# Patient Record
Sex: FEMALE | Race: WHITE | NOT HISPANIC OR LATINO | URBAN - METROPOLITAN AREA
[De-identification: names, ages, dates, MRNs, and addresses within clinical notes are randomized per-mention and may not be internally consistent; named-entity substitution may affect disease eponyms.]

---

## 2019-05-17 ENCOUNTER — INPATIENT (INPATIENT)
Facility: HOSPITAL | Age: 59
LOS: 1 days | Discharge: HOME | End: 2019-05-19
Attending: INTERNAL MEDICINE | Admitting: INTERNAL MEDICINE
Payer: MEDICAID

## 2019-05-17 VITALS
HEART RATE: 105 BPM | OXYGEN SATURATION: 92 % | DIASTOLIC BLOOD PRESSURE: 51 MMHG | SYSTOLIC BLOOD PRESSURE: 98 MMHG | RESPIRATION RATE: 20 BRPM

## 2019-05-17 DIAGNOSIS — Z90.710 ACQUIRED ABSENCE OF BOTH CERVIX AND UTERUS: Chronic | ICD-10-CM

## 2019-05-17 DIAGNOSIS — Z98.84 BARIATRIC SURGERY STATUS: Chronic | ICD-10-CM

## 2019-05-17 DIAGNOSIS — Z90.89 ACQUIRED ABSENCE OF OTHER ORGANS: Chronic | ICD-10-CM

## 2019-05-17 LAB
ALBUMIN SERPL ELPH-MCNC: 3.9 G/DL — SIGNIFICANT CHANGE UP (ref 3.5–5.2)
ALP SERPL-CCNC: 139 U/L — HIGH (ref 30–115)
ALT FLD-CCNC: 33 U/L — SIGNIFICANT CHANGE UP (ref 0–41)
ANION GAP SERPL CALC-SCNC: 11 MMOL/L — SIGNIFICANT CHANGE UP (ref 7–14)
APAP SERPL-MCNC: <5 UG/ML — LOW (ref 10–30)
APTT BLD: 35.2 SEC — SIGNIFICANT CHANGE UP (ref 27–39.2)
AST SERPL-CCNC: 33 U/L — SIGNIFICANT CHANGE UP (ref 0–41)
BASE EXCESS BLDV CALC-SCNC: 1.1 MMOL/L — SIGNIFICANT CHANGE UP (ref -2–2)
BASOPHILS # BLD AUTO: 0.02 K/UL — SIGNIFICANT CHANGE UP (ref 0–0.2)
BASOPHILS NFR BLD AUTO: 0.5 % — SIGNIFICANT CHANGE UP (ref 0–1)
BILIRUB SERPL-MCNC: 0.3 MG/DL — SIGNIFICANT CHANGE UP (ref 0.2–1.2)
BLD GP AB SCN SERPL QL: SIGNIFICANT CHANGE UP
BUN SERPL-MCNC: 26 MG/DL — HIGH (ref 10–20)
CA-I SERPL-SCNC: 1.18 MMOL/L — SIGNIFICANT CHANGE UP (ref 1.12–1.3)
CALCIUM SERPL-MCNC: 8.8 MG/DL — SIGNIFICANT CHANGE UP (ref 8.5–10.1)
CHLORIDE SERPL-SCNC: 105 MMOL/L — SIGNIFICANT CHANGE UP (ref 98–110)
CK MB CFR SERPL CALC: 6.1 NG/ML — SIGNIFICANT CHANGE UP (ref 0.6–6.3)
CK SERPL-CCNC: 123 U/L — SIGNIFICANT CHANGE UP (ref 0–225)
CO2 SERPL-SCNC: 26 MMOL/L — SIGNIFICANT CHANGE UP (ref 17–32)
CREAT SERPL-MCNC: 1.2 MG/DL — SIGNIFICANT CHANGE UP (ref 0.7–1.5)
EOSINOPHIL # BLD AUTO: 0.1 K/UL — SIGNIFICANT CHANGE UP (ref 0–0.7)
EOSINOPHIL NFR BLD AUTO: 2.3 % — SIGNIFICANT CHANGE UP (ref 0–8)
ETHANOL SERPL-MCNC: <10 MG/DL — SIGNIFICANT CHANGE UP
GAS PNL BLDV: 141 MMOL/L — SIGNIFICANT CHANGE UP (ref 136–145)
GAS PNL BLDV: SIGNIFICANT CHANGE UP
GLUCOSE SERPL-MCNC: 107 MG/DL — HIGH (ref 70–99)
HCO3 BLDV-SCNC: 28 MMOL/L — SIGNIFICANT CHANGE UP (ref 22–29)
HCT VFR BLD CALC: 38 % — SIGNIFICANT CHANGE UP (ref 37–47)
HCT VFR BLDA CALC: 37.2 % — SIGNIFICANT CHANGE UP (ref 34–44)
HGB BLD CALC-MCNC: 12.1 G/DL — LOW (ref 14–18)
HGB BLD-MCNC: 11.6 G/DL — LOW (ref 12–16)
IMM GRANULOCYTES NFR BLD AUTO: 0 % — LOW (ref 0.1–0.3)
INR BLD: 0.96 RATIO — SIGNIFICANT CHANGE UP (ref 0.65–1.3)
LACTATE BLDV-MCNC: 1.1 MMOL/L — SIGNIFICANT CHANGE UP (ref 0.5–1.6)
LYMPHOCYTES # BLD AUTO: 1.12 K/UL — LOW (ref 1.2–3.4)
LYMPHOCYTES # BLD AUTO: 25.8 % — SIGNIFICANT CHANGE UP (ref 20.5–51.1)
MCHC RBC-ENTMCNC: 27.6 PG — SIGNIFICANT CHANGE UP (ref 27–31)
MCHC RBC-ENTMCNC: 30.5 G/DL — LOW (ref 32–37)
MCV RBC AUTO: 90.5 FL — SIGNIFICANT CHANGE UP (ref 81–99)
MONOCYTES # BLD AUTO: 0.51 K/UL — SIGNIFICANT CHANGE UP (ref 0.1–0.6)
MONOCYTES NFR BLD AUTO: 11.8 % — HIGH (ref 1.7–9.3)
NEUTROPHILS # BLD AUTO: 2.59 K/UL — SIGNIFICANT CHANGE UP (ref 1.4–6.5)
NEUTROPHILS NFR BLD AUTO: 59.6 % — SIGNIFICANT CHANGE UP (ref 42.2–75.2)
NRBC # BLD: 0 /100 WBCS — SIGNIFICANT CHANGE UP (ref 0–0)
PCO2 BLDV: 56 MMHG — HIGH (ref 41–51)
PH BLDV: 7.31 — SIGNIFICANT CHANGE UP (ref 7.26–7.43)
PLATELET # BLD AUTO: 161 K/UL — SIGNIFICANT CHANGE UP (ref 130–400)
PO2 BLDV: 41 MMHG — HIGH (ref 20–40)
POTASSIUM BLDV-SCNC: 4.6 MMOL/L — SIGNIFICANT CHANGE UP (ref 3.3–5.6)
POTASSIUM SERPL-MCNC: 4.7 MMOL/L — SIGNIFICANT CHANGE UP (ref 3.5–5)
POTASSIUM SERPL-SCNC: 4.7 MMOL/L — SIGNIFICANT CHANGE UP (ref 3.5–5)
PROT SERPL-MCNC: 6.5 G/DL — SIGNIFICANT CHANGE UP (ref 6–8)
PROTHROM AB SERPL-ACNC: 11.1 SEC — SIGNIFICANT CHANGE UP (ref 9.95–12.87)
RBC # BLD: 4.2 M/UL — SIGNIFICANT CHANGE UP (ref 4.2–5.4)
RBC # FLD: 13.4 % — SIGNIFICANT CHANGE UP (ref 11.5–14.5)
SALICYLATES SERPL-MCNC: <0.3 MG/DL — LOW (ref 4–30)
SAO2 % BLDV: 77 % — SIGNIFICANT CHANGE UP
SODIUM SERPL-SCNC: 142 MMOL/L — SIGNIFICANT CHANGE UP (ref 135–146)
TROPONIN T SERPL-MCNC: <0.01 NG/ML — SIGNIFICANT CHANGE UP
TYPE + AB SCN PNL BLD: SIGNIFICANT CHANGE UP
WBC # BLD: 4.34 K/UL — LOW (ref 4.8–10.8)
WBC # FLD AUTO: 4.34 K/UL — LOW (ref 4.8–10.8)

## 2019-05-17 PROCEDURE — 71045 X-RAY EXAM CHEST 1 VIEW: CPT | Mod: 26

## 2019-05-17 PROCEDURE — 99291 CRITICAL CARE FIRST HOUR: CPT

## 2019-05-17 PROCEDURE — 70450 CT HEAD/BRAIN W/O DYE: CPT | Mod: 26

## 2019-05-17 PROCEDURE — 93010 ELECTROCARDIOGRAM REPORT: CPT

## 2019-05-17 RX ORDER — SODIUM CHLORIDE 9 MG/ML
1000 INJECTION, SOLUTION INTRAVENOUS ONCE
Refills: 0 | Status: COMPLETED | OUTPATIENT
Start: 2019-05-17 | End: 2019-05-17

## 2019-05-17 RX ORDER — ASPIRIN/CALCIUM CARB/MAGNESIUM 324 MG
325 TABLET ORAL ONCE
Refills: 0 | Status: COMPLETED | OUTPATIENT
Start: 2019-05-17 | End: 2019-05-17

## 2019-05-17 RX ORDER — ASPIRIN/CALCIUM CARB/MAGNESIUM 324 MG
81 TABLET ORAL DAILY
Refills: 0 | Status: DISCONTINUED | OUTPATIENT
Start: 2019-05-18 | End: 2019-05-19

## 2019-05-17 RX ORDER — ATORVASTATIN CALCIUM 80 MG/1
40 TABLET, FILM COATED ORAL AT BEDTIME
Refills: 0 | Status: DISCONTINUED | OUTPATIENT
Start: 2019-05-18 | End: 2019-05-19

## 2019-05-17 RX ORDER — CLONAZEPAM 1 MG
0.5 TABLET ORAL
Refills: 0 | Status: DISCONTINUED | OUTPATIENT
Start: 2019-05-17 | End: 2019-05-19

## 2019-05-17 RX ORDER — TRAZODONE HCL 50 MG
1 TABLET ORAL
Qty: 0 | Refills: 0 | DISCHARGE

## 2019-05-17 RX ORDER — GABAPENTIN 400 MG/1
300 CAPSULE ORAL
Refills: 0 | Status: DISCONTINUED | OUTPATIENT
Start: 2019-05-17 | End: 2019-05-19

## 2019-05-17 RX ORDER — CYCLOBENZAPRINE HYDROCHLORIDE 10 MG/1
10 TABLET, FILM COATED ORAL THREE TIMES A DAY
Refills: 0 | Status: DISCONTINUED | OUTPATIENT
Start: 2019-05-17 | End: 2019-05-19

## 2019-05-17 RX ORDER — ATORVASTATIN CALCIUM 80 MG/1
40 TABLET, FILM COATED ORAL ONCE
Refills: 0 | Status: COMPLETED | OUTPATIENT
Start: 2019-05-17 | End: 2019-05-17

## 2019-05-17 RX ORDER — TRAZODONE HCL 50 MG
100 TABLET ORAL
Refills: 0 | Status: DISCONTINUED | OUTPATIENT
Start: 2019-05-17 | End: 2019-05-19

## 2019-05-17 RX ADMIN — Medication 100 MILLIGRAM(S): at 23:48

## 2019-05-17 RX ADMIN — GABAPENTIN 300 MILLIGRAM(S): 400 CAPSULE ORAL at 23:48

## 2019-05-17 RX ADMIN — Medication 325 MILLIGRAM(S): at 19:26

## 2019-05-17 RX ADMIN — ATORVASTATIN CALCIUM 40 MILLIGRAM(S): 80 TABLET, FILM COATED ORAL at 19:26

## 2019-05-17 RX ADMIN — SODIUM CHLORIDE 1000 MILLILITER(S): 9 INJECTION, SOLUTION INTRAVENOUS at 18:39

## 2019-05-17 RX ADMIN — Medication 0.5 MILLIGRAM(S): at 23:48

## 2019-05-17 NOTE — ED PROVIDER NOTE - OBJECTIVE STATEMENT
60 y/o F PMH Hep C who presents with slurred speech and not feeling right.  Patient was last known and felt well (with a friend at 1-1:30pm).  Patient then started having slurred speech.  She then smoke marijuana and continued not to feel well. Patient then spoke with her significant other and told him she thought she was having a stroke and he brought her into the hospital.  Patient was otherwise in normal state of health this morning and smokes marijuana daily.  She denies any alcohol or other drug issue.  No fever or recent illness.  No falls or head trauma.    Patient denies chest pain, SOB, numbness, weakness, tingling.

## 2019-05-17 NOTE — ED ADULT NURSE REASSESSMENT NOTE - NS ED NURSE REASSESS COMMENT FT1
pt assessed by oncoming rn . pt [id number]. pt nih scale 1. pt vss. pt satting 99% on room air. pt passed dysphagia screen. rn will continue to monitor.

## 2019-05-17 NOTE — ED PROVIDER NOTE - PHYSICAL EXAMINATION
VITAL SIGNS: I have reviewed nursing notes and confirm.  CONSTITUTIONAL: Patient is slow to answer questions with slurred speech.  No AOB but patient may be intoxicated on drugs (e.g. marijuana)  SKIN: Warm dry, normal skin turgor  HEAD: NCAT  EYES: EOMI, PERRLA, no scleral icterus  ENT: Moist mucous membranes, normal pharynx with no erythema or exudates  NECK: Supple; non tender. Full ROM. No cervical LAD  CARD: RRR, no murmurs, rubs or gallops  RESP: clear to ausculation b/l.  No rales, rhonchi, or wheezing.  ABD: soft, + BS, non-tender, non-distended, no rebound or guarding. No CVA tenderness  EXT: Full ROM, no bony tenderness, no pedal edema, no calf tenderness  NEURO: NIH as documented in stroke note. normal motor. normal sensory. CN II-XII intact. Cerebellar testing normal. Normal gait.  PSYCH: Cooperative, appropriate.

## 2019-05-17 NOTE — H&P ADULT - NSICDXPASTMEDICALHX_GEN_ALL_CORE_FT
PAST MEDICAL HISTORY:  Cervical cancer     Chronic hepatitis C without hepatic coma     History of osteonecrosis     Prediabetes

## 2019-05-17 NOTE — H&P ADULT - NSHPPHYSICALEXAM_GEN_ALL_CORE
Vital Signs Last 24 Hrs  T(C): 36.7 (17 May 2019 20:40), Max: 36.7 (17 May 2019 20:40)  T(F): 98 (17 May 2019 20:40), Max: 98 (17 May 2019 20:40)  HR: 94 (17 May 2019 20:40) (87 - 105)  BP: 133/75 (17 May 2019 20:40) (90/54 - 133/75)  RR: 18 (17 May 2019 20:40) (18 - 20)  SpO2: 99% (17 May 2019 20:40) (92% - 99%)    alert oriented, not in any acute distress  no facial droop  chest clear bilaterally  cvs s1 and s2 no added sound  abdomen soft lax no organomegaly  no pedal edema  neuro all cranial nerves intact  power 5/5 bilateral UL/ LL  sensations intact bilaterally  cerebellar sign negative  babinski neg

## 2019-05-17 NOTE — ED PROVIDER NOTE - CLINICAL SUMMARY MEDICAL DECISION MAKING FREE TEXT BOX
Patient was a stroke code on arrival for slurred speech and not feeling well.  Initial NIH as documented 2).  Patient is outside of the t-PA window and not a candidate for t-PA.  Stroke attending is Dr. Angel and case discussed in detail and plan of care discussed. . Patient is not an interventional candidate given current NIH score.  Clinical picture also clouded by possible drug intoxication. Head CT shows no acute findings.  Will give Aspirin and Statin and patient being admitted to Stroke Unit.

## 2019-05-17 NOTE — H&P ADULT - ASSESSMENT
59 years old female pt with past medical hx of hep c never treated, osteonecrosis of bilateral knees and prediabetes came to ER because of slurring of speech.    # slurring of speech for evaluation      doubt stroke      ct head unremarkable      likely medication induced      will check urine for toxicology      will try to taper her sedatives      reduce gabapentin to 300 mg BID      reduce clonazepam to 0.5 mg BID, was on 3 mg daily      will get neurology on board      keep aspirin and statin for now      check TSH, vitamin b12      # h/o of hep c     will check viral load, hep c profile     HIV     follow up GI as outpt    # prediabetes     check hba1c, lipid profile      # DVT prophylaxis heparin 5000 unit TID    # diet regular 59 years old female pt with past medical hx of hep c never treated, osteonecrosis of bilateral knees and prediabetes came to ER because of slurring of speech.    # slurring of speech for evaluation      doubt stroke/ TIA      abcd2 SCORE 2      ct head unremarkable      likely medication induced      will check urine for toxicology      will try to taper her sedatives      reduce gabapentin to 300 mg BID      reduce clonazepam to 0.5 mg BID, was on 3 mg daily      will get neurology on board      keep aspirin and statin for now      check TSH, vitamin b12      # h/o of hep c     will check viral load, hep c profile     HIV     follow up GI as outpt    # prediabetes     check hba1c, lipid profile      # DVT prophylaxis heparin 5000 unit TID    # diet regular 59 years old female pt with past medical hx of hep c never treated, osteonecrosis of bilateral knees and prediabetes came to ER because of slurring of speech.    # slurring of speech for evaluation      rule out stroke/ TIA      abcd2 SCORE 2      ct head unremarkable      likely medication induced      will check urine for toxicology      will try to taper her sedatives      reduce gabapentin to 300 mg BID      reduce clonazepam to 0.5 mg BID, was on 3 mg daily      will get neurology on board      keep aspirin and statin for now      check TSH, vitamin b12      # h/o of hep c     will check viral load, hep c profile     HIV     follow up GI as outpt    # prediabetes     check hba1c, lipid profile    # DVT prophylaxis heparin 5000 unit TID    # diet low fat,

## 2019-05-17 NOTE — H&P ADULT - NSHPSOCIALHISTORY_GEN_ALL_CORE
active smoker smokes 1-2 cig a day for > 35 years  denies alcohol or any illicit drug  but per chart note she smokes marijuana

## 2019-05-17 NOTE — H&P ADULT - HISTORY OF PRESENT ILLNESS
59 years old female pt with past medical hx of hep c never treated, osteonecrosis of bilateral knees and prediabetes came to ER because of slurring of speech.  As per patient for last 1 week, her boy friend and friends have noticed some slurring in her speech, but this evening, she felt she couldnt speak, denies any facial droop, chocking, no weakness, no chest pain, no palpitation, no visual symptoms, no headaches.  she has been feeling generalized weak for 1 week, but denies any weight changes.  she is taking a lot of sedatives for her pain but their dose has been unchanged for last 1 years, no recent change in her medication.  She denies taking any illicit drug, but as per nurse and ED doctor, after slurring of speech, she went out to smoke marijuana but didnt notice any improvement and came to ER.  In ER stroke code called, ct head was unremarkable, initial NIHSS 2

## 2019-05-17 NOTE — ED ADULT TRIAGE NOTE - CHIEF COMPLAINT QUOTE
patient noted to have slurred speech - unknown time line  as per  unsteady gait noted. patient pupils equal slow to react approx 3mm

## 2019-05-17 NOTE — ED PROVIDER NOTE - NS ED ROS FT
Constitutional:  No fever, chills, lethargy, or abnormal weight loss  Eyes:  No eye pain or visual changes  ENMT: No nasal discharge, no toothache, no sore throat. No neck pain or stiffness  Cardiac:  No chest pain or palpitations  Respiratory:  No cough or respiratory distress.   GI:  No nausea, vomiting, diarrhea or abdominal pain.  :  No dysuria, frequency or burning.  MS:  No back or joint pain.  Neuro:  No headache. + Slurred speech. No numbness, weakness, or tingling.   Skin:  No skin rash  Except as documented in the HPI,  all other systems are negative

## 2019-05-17 NOTE — H&P ADULT - NSHPLABSRESULTS_GEN_ALL_CORE
05-17    142  |  105  |  26<H>  ----------------------------<  107<H>  4.7   |  26  |  1.2    Ca    8.8      17 May 2019 18:04    TPro  6.5  /  Alb  3.9  /  TBili  0.3  /  DBili  x   /  AST  33  /  ALT  33  /  AlkPhos  139<H>  05-17                            11.6   4.34  )-----------( 161      ( 17 May 2019 18:04 )             38.0     CT Brain Stroke Protocol (05.17.19 @ 18:13) >      Impression:     No CT evidence of acute large territorial infarct.    Dr. Mari Guillen discussed preliminary findings with DARRYL PELAYO MD on 5/17/2019 6:16 PM with readback.    < end of copied text >

## 2019-05-18 LAB
ANION GAP SERPL CALC-SCNC: 8 MMOL/L — SIGNIFICANT CHANGE UP (ref 7–14)
BUN SERPL-MCNC: 23 MG/DL — HIGH (ref 10–20)
CALCIUM SERPL-MCNC: 8.6 MG/DL — SIGNIFICANT CHANGE UP (ref 8.5–10.1)
CHLORIDE SERPL-SCNC: 109 MMOL/L — SIGNIFICANT CHANGE UP (ref 98–110)
CHOLEST SERPL-MCNC: 104 MG/DL — SIGNIFICANT CHANGE UP (ref 100–200)
CO2 SERPL-SCNC: 28 MMOL/L — SIGNIFICANT CHANGE UP (ref 17–32)
CREAT SERPL-MCNC: 0.7 MG/DL — SIGNIFICANT CHANGE UP (ref 0.7–1.5)
ESTIMATED AVERAGE GLUCOSE: 91 MG/DL — SIGNIFICANT CHANGE UP (ref 68–114)
GLUCOSE SERPL-MCNC: 79 MG/DL — SIGNIFICANT CHANGE UP (ref 70–99)
HBA1C BLD-MCNC: 4.8 % — SIGNIFICANT CHANGE UP (ref 4–5.6)
HCT VFR BLD CALC: 35.8 % — LOW (ref 37–47)
HDLC SERPL-MCNC: 36 MG/DL — LOW
HGB BLD-MCNC: 10.9 G/DL — LOW (ref 12–16)
HIV 1+2 AB+HIV1 P24 AG SERPL QL IA: SIGNIFICANT CHANGE UP
LIPID PNL WITH DIRECT LDL SERPL: 57 MG/DL — SIGNIFICANT CHANGE UP (ref 4–129)
MAGNESIUM SERPL-MCNC: 1.8 MG/DL — SIGNIFICANT CHANGE UP (ref 1.8–2.4)
MCHC RBC-ENTMCNC: 27.7 PG — SIGNIFICANT CHANGE UP (ref 27–31)
MCHC RBC-ENTMCNC: 30.4 G/DL — LOW (ref 32–37)
MCV RBC AUTO: 90.9 FL — SIGNIFICANT CHANGE UP (ref 81–99)
NRBC # BLD: 0 /100 WBCS — SIGNIFICANT CHANGE UP (ref 0–0)
PHOSPHATE SERPL-MCNC: 3.8 MG/DL — SIGNIFICANT CHANGE UP (ref 2.1–4.9)
PLATELET # BLD AUTO: 126 K/UL — LOW (ref 130–400)
POTASSIUM SERPL-MCNC: 4.4 MMOL/L — SIGNIFICANT CHANGE UP (ref 3.5–5)
POTASSIUM SERPL-SCNC: 4.4 MMOL/L — SIGNIFICANT CHANGE UP (ref 3.5–5)
RBC # BLD: 3.94 M/UL — LOW (ref 4.2–5.4)
RBC # FLD: 13.2 % — SIGNIFICANT CHANGE UP (ref 11.5–14.5)
SODIUM SERPL-SCNC: 145 MMOL/L — SIGNIFICANT CHANGE UP (ref 135–146)
TOTAL CHOLESTEROL/HDL RATIO MEASUREMENT: 2.9 RATIO — LOW (ref 4–5.5)
TRIGL SERPL-MCNC: 102 MG/DL — SIGNIFICANT CHANGE UP (ref 10–149)
WBC # BLD: 2.67 K/UL — LOW (ref 4.8–10.8)
WBC # FLD AUTO: 2.67 K/UL — LOW (ref 4.8–10.8)

## 2019-05-18 PROCEDURE — 70549 MR ANGIOGRAPH NECK W/O&W/DYE: CPT | Mod: 26

## 2019-05-18 PROCEDURE — 99223 1ST HOSP IP/OBS HIGH 75: CPT

## 2019-05-18 PROCEDURE — 70551 MRI BRAIN STEM W/O DYE: CPT | Mod: 26

## 2019-05-18 PROCEDURE — 93306 TTE W/DOPPLER COMPLETE: CPT | Mod: 26

## 2019-05-18 PROCEDURE — 70544 MR ANGIOGRAPHY HEAD W/O DYE: CPT | Mod: 26,59

## 2019-05-18 RX ORDER — CLONAZEPAM 1 MG
0.5 TABLET ORAL ONCE
Refills: 0 | Status: DISCONTINUED | OUTPATIENT
Start: 2019-05-18 | End: 2019-05-18

## 2019-05-18 RX ADMIN — GABAPENTIN 300 MILLIGRAM(S): 400 CAPSULE ORAL at 12:26

## 2019-05-18 RX ADMIN — Medication 0.5 MILLIGRAM(S): at 22:14

## 2019-05-18 RX ADMIN — Medication 100 MILLIGRAM(S): at 22:14

## 2019-05-18 RX ADMIN — GABAPENTIN 300 MILLIGRAM(S): 400 CAPSULE ORAL at 22:14

## 2019-05-18 RX ADMIN — Medication 100 MILLIGRAM(S): at 12:26

## 2019-05-18 RX ADMIN — Medication 0.5 MILLIGRAM(S): at 12:26

## 2019-05-18 RX ADMIN — Medication 0.5 MILLIGRAM(S): at 22:55

## 2019-05-18 RX ADMIN — Medication 81 MILLIGRAM(S): at 12:26

## 2019-05-18 RX ADMIN — ATORVASTATIN CALCIUM 40 MILLIGRAM(S): 80 TABLET, FILM COATED ORAL at 22:13

## 2019-05-18 NOTE — PROGRESS NOTE ADULT - ASSESSMENT
59 years old female pt with past medical hx of hep c never treated, osteonecrosis of bilateral knees and prediabetes came to ER because of slurring of speech.    Slurring of speech for evaluation likely medication induced  doubt stroke/ TIA  abcd2 SCORE 2  CTH negative  Pending Urine tox  Taper her sedatives  Gabapentin reduced to 300 mg BID  Clonazepam reduced to 0.5 mg BID, was on 3 mg daily  Neuro following  Keep aspirin and statin for now  f/u TSH, vitamin b12    h/o of hep c  will check viral load, hep c profile  HIV  follow up GI as outpt    Prediabetes; check hba1c, lipid profile    DVT prophylaxis heparin 5000 unit TID  Ambulate as tolerated  Diet regular

## 2019-05-18 NOTE — CONSULT NOTE ADULT - SUBJECTIVE AND OBJECTIVE BOX
CC: Slurring      HPI:   59 years old female  with pmhx of hep C never treated, osteonecrosis of bilateral knees and prediabetes came to ER because of slurring of speech. Patient states that since last 1 week her family members have noticed that she was slurring her speech, but as of last evening she felt as if she could not talk. She has been C/O of generalized weakness for past 1 week. medication. She denies taking any illicit drug, but as per ED documentation ,when patient started having difficulty speaking ,she went out to smoke marijuana but since her symptoms persisted she came to ED. In ER stroke code called, ct head was unremarkable, initial NIHSS 2 . (patient does mention about a fall and loc but is not clear when and how she fell)    Home Medications:  Clonazepam 1 mg oral tablet: 1 TAB Q 8  cyclobenzaprine 10 mg oral tablet: 1 tab Q 8  Gabapentin 300 mg oral tablet: 4 ORALLY Q bid  Trazadone 100 mg oral tablet: q bid    Social history  +20 pack year hx  social alcohol use  +drug use  Patient lives at home with her sister and is fully functional at baseline      Family history  Father with lung ca  Mother with ca      Neuro Exam:  Orientation: patient is very somnolent , needs frequent arousals during interview, speech is dysarthric, normal comprehension, able to name recognize and repeat  Cranial Nerves: mild -dysarthria   Motor: 5/5 throughout / no drift             mild asterixis on exam  Sensory exam:  intact.   Coordination:. no dysmetria or limb ataxia  Deep tendon reflexes: 2+/4  Plantar responses normal     NIHSS: 2  loc  1  commands 0    questions  0  gaze 0  vf: 0  motor :0  face 0  ataxia 0  speech 1  language 0  extinction 0    mRs 2  mRs baseline:0    Allergies    No Known Allergies    Intolerances      MEDICATIONS  (STANDING):  aspirin  chewable 81 milliGRAM(s) Oral daily  atorvastatin 40 milliGRAM(s) Oral at bedtime  clonazePAM  Tablet 0.5 milliGRAM(s) Oral two times a day  gabapentin 300 milliGRAM(s) Oral two times a day  traZODone 100 milliGRAM(s) Oral two times a day    MEDICATIONS  (PRN):  cyclobenzaprine 10 milligram Oral three times a day PRN Muscle Spasm      LABS:                        11.6   4.34  )-----------( 161      ( 17 May 2019 18:04 )             38.0     05-17    142  |  105  |  26<H>  ----------------------------<  107<H>  4.7   |  26  |  1.2    Ca    8.8      17 May 2019 18:04    TPro  6.5  /  Alb  3.9  /  TBili  0.3  /  DBili  x   /  AST  33  /  ALT  33  /  AlkPhos  139<H>  05-17    PT/INR - ( 17 May 2019 18:04 )   PT: 11.10 sec;   INR: 0.96 ratio         PTT - ( 17 May 2019 18:04 )  PTT:35.2 sec        Neuro Imaging:  < from: CT Brain Stroke Protocol (05.17.19 @ 18:13) >  Findings:    The ventricles, basal cisterns and sulcal pattern are within normal   limits for the patient's stated age.      Punctate periventricular and deep white matter hypodensities consistent   with chronic microvascular ischemic changes.    Grey-white differentiation is preserved.    There is no acute mass effect, midline shift or intracranial hemorrhage.      The calvarium is intact.    The visualized paranasal sinuses and bilateral mastoid complexes are   unremarkable. The globes and orbits are unremarkable.    Beam hardening artifact is noted overlying the brain stem and posterior   fossa which is inherent to CT in this location.    Impression:     No CT evidence of acute large territorial infarct.      < end of copied text >    EEG:     Echo:   Carotid Doppler: N/A  Telemetry:

## 2019-05-18 NOTE — SWALLOW BEDSIDE ASSESSMENT ADULT - SWALLOW EVAL: DIAGNOSIS
balwinder-pharyngeal swallow is WFL for reg w/ thin, no overt s/s of penetration/aspiration observed

## 2019-05-18 NOTE — PROGRESS NOTE ADULT - SUBJECTIVE AND OBJECTIVE BOX
LENGTH OF HOSPITAL STAY: 1d    CHIEF COMPLAINT:   Patient is a 59y old  Female who presents with a chief complaint of slurring of speech that started this evening (18 May 2019 05:04)      HISTORY OF PRESENTING ILLNESS:    HPI:  59 years old female pt with past medical hx of hep c never treated, osteonecrosis of bilateral knees and prediabetes came to ER because of slurring of speech.  As per patient for last 1 week, her boy friend and friends have noticed some slurring in her speech, but this evening, she felt she couldnt speak, denies any facial droop, chocking, no weakness, no chest pain, no palpitation, no visual symptoms, no headaches.  she has been feeling generalized weak for 1 week, but denies any weight changes.  she is taking a lot of sedatives for her pain but their dose has been unchanged for last 1 years, no recent change in her medication.  She denies taking any illicit drug, but as per nurse and ED doctor, after slurring of speech, she went out to smoke marijuana but didnt notice any improvement and came to ER.  In ER stroke code called, ct head was unremarkable, initial NIHSS 2 (17 May 2019 22:10)    PAST MEDICAL & SURGICAL HISTORY  PAST MEDICAL & SURGICAL HISTORY:  Prediabetes  Cervical cancer  History of osteonecrosis  Chronic hepatitis C without hepatic coma  Status post gastric banding  S/P tonsillectomy  S/P hysterectomy    SOCIAL HISTORY:    ALLERGIES:  No Known Allergies    MEDICATIONS:  STANDING MEDICATIONS  aspirin  chewable 81 milliGRAM(s) Oral daily  atorvastatin 40 milliGRAM(s) Oral at bedtime  clonazePAM  Tablet 0.5 milliGRAM(s) Oral two times a day  gabapentin 300 milliGRAM(s) Oral two times a day  traZODone 100 milliGRAM(s) Oral two times a day    PRN MEDICATIONS  cyclobenzaprine 10 milliGRAM(s) Oral three times a day PRN    VITALS & PHYSICAL EXAM:  Vital Signs Last 24 Hrs  T(C): 36.9 (18 May 2019 07:36), Max: 36.9 (18 May 2019 07:36)  T(F): 98.4 (18 May 2019 07:36), Max: 98.4 (18 May 2019 07:36)  HR: 91 (18 May 2019 07:36) (78 - 105)  BP: 148/83 (18 May 2019 07:36) (90/54 - 148/83)  BP(mean): --  RR: 18 (18 May 2019 07:36) (16 - 20)  SpO2: 97% (18 May 2019 07:36) (92% - 99%)    Gen: alert oriented, not in any acute distress  Cardiac: S1 S2, RRR  Chest: CTAB GBAE  Abd: soft, NTND, +BS  Ext: +2 PP b/l, -ve LLE b/l  Neuro: no facial droop, CN II-XII grossly intact, power 5/5 bilateral UL/ LL, sensations intact bilaterally, cerebellar sign negative, babinski neg    LABS:                        10.9   2.67  )-----------( 126      ( 18 May 2019 06:00 )             35.8     05-18    145  |  109  |  23<H>  ----------------------------<  79  4.4   |  28  |  0.7    Ca    8.6      18 May 2019 06:00  Phos  3.8     05-18  Mg     1.8     05-18    TPro  6.5  /  Alb  3.9  /  TBili  0.3  /  DBili  x   /  AST  33  /  ALT  33  /  AlkPhos  139<H>  05-17    PT/INR - ( 17 May 2019 18:04 )   PT: 11.10 sec;   INR: 0.96 ratio    PTT - ( 17 May 2019 18:04 )  PTT:35.2 sec      Creatine Kinase, Serum: 123 U/L (05-17-19 @ 18:04)  Troponin T, Serum: <0.01 ng/mL (05-17-19 @ 18:04)      CARDIAC MARKERS ( 17 May 2019 18:04 )  x     / <0.01 ng/mL / 123 U/L / x     / 6.1 ng/mL      RADIOLOGY:  < from: CT Brain Stroke Protocol (05.17.19 @ 18:13) >  Impression:   No CT evidence of acute large territorial infarct.  < end of copied text >

## 2019-05-18 NOTE — CONSULT NOTE ADULT - ASSESSMENT
59 years old female with pmhx of hep C never treated, osteonecrosis of bilateral knees and prediabetes came to ER because of slurring of speech. Patient was not a TPA candidate on initial eval as she was out of the therapeutic window. On this exam patient is somnolent and her speech remains dysarthric. NIHSS 2 CTH was negative for acute findings.    ddx  : STROKE              Seizure      Plan  Admit to stroke unit  REEG  N/c mri brain   Mra h/n  Echo   Lipid profile and hbaic  Start asa 81 mg q 24   Start Lipitor 80mg q 24  Speech swallow eval  Pt/ot eval  Neuro checks q 4 hrs

## 2019-05-18 NOTE — PROGRESS NOTE ADULT - SUBJECTIVE AND OBJECTIVE BOX
Neurology Follow up note    Name  ALFONZO WANG    HPI:  59 years old female pt with past medical hx of hep c never treated, osteonecrosis of bilateral knees and prediabetes came to ER because of slurring of speech.  As per patient for last 1 week, her boy friend and friends have noticed some slurring in her speech, but this evening, she felt she couldnt speak, denies any facial droop, chocking, no weakness, no chest pain, no palpitation, no visual symptoms, no headaches.  she has been feeling generalized weak for 1 week, but denies any weight changes.  she is taking a lot of sedatives for her pain but their dose has been unchanged for last 1 years, no recent change in her medication.  She denies taking any illicit drug, but as per nurse and ED doctor, after slurring of speech, she went out to smoke marijuana but didnt notice any improvement and came to ER.  In ER stroke code called, ct head was unremarkable, initial NIHSS 2 (17 May 2019 22:10)      Interval History:    patient is back to her baseline   wants to leave hospital      Vital Signs Last 24 Hrs  T(C): 36.9 (18 May 2019 07:36), Max: 36.9 (18 May 2019 07:36)  T(F): 98.4 (18 May 2019 07:36), Max: 98.4 (18 May 2019 07:36)  HR: 92 (18 May 2019 09:30) (78 - 105)  BP: 130/72 (18 May 2019 09:30) (90/54 - 148/83)  BP(mean): --  RR: 16 (18 May 2019 09:30) (16 - 20)  SpO2: 98% (18 May 2019 09:30) (92% - 99%)    Neurological Exam:   Mental status: Awake, alert and oriented x3.  Recent and remote memory intact.  Naming, repetition and comprehension intact.  Attention/concentration intact.  No dysarthria, no aphasia.  Fund of knowledge appropriate.    Cranial nerves: Pupils equally round and reactive to light, visual fields full, no nystagmus, extraocular muscles intact, V1 through V3 intact bilaterally and symmetric, face symmetric, hearing intact to finger rub, palate elevation symmetric, tongue was midline.  Motor:  MRC grading 5/5 b/l UE/LE.   strength 5/5.  Normal tone and bulk.  No abnormal movements.    Sensation: Intact to light touch, proprioception, and pinprick.   Coordination: No dysmetria on finger-to-nose and heel-to-shin.  No dysdiadokinesia.  Reflexes: 2+ in bilateral UE/LE, downgoing toes bilaterally. (-) Velarde.  Gait: Narrow and steady. No ataxia.  Romberg negative  NIHSS 0    Medications  aspirin  chewable 81 milliGRAM(s) Oral daily  atorvastatin 40 milliGRAM(s) Oral at bedtime  clonazePAM  Tablet 0.5 milliGRAM(s) Oral two times a day  cyclobenzaprine 10 milliGRAM(s) Oral three times a day PRN  gabapentin 300 milliGRAM(s) Oral two times a day  traZODone 100 milliGRAM(s) Oral two times a day      Lab  05-18    145  |  109  |  23<H>  ----------------------------<  79  4.4   |  28  |  0.7    Ca    8.6      18 May 2019 06:00  Phos  3.8     05-18  Mg     1.8     05-18    TPro  6.5  /  Alb  3.9  /  TBili  0.3  /  DBili  x   /  AST  33  /  ALT  33  /  AlkPhos  139<H>  05-17                          10.9   2.67  )-----------( 126      ( 18 May 2019 06:00 )             35.8     LIVER FUNCTIONS - ( 17 May 2019 18:04 )  Alb: 3.9 g/dL / Pro: 6.5 g/dL / ALK PHOS: 139 U/L / ALT: 33 U/L / AST: 33 U/L / GGT: x             1.8        Radiology      Assessment: 58 YO FEMALE ADM WITH NEW ONSET DYSARTHRIA  R/O CVA  PATIENT W/ NIHSS 0 AT PRESENT    Plan:  MRI BRAIN AND MRA H/N  ECHO  CHECK LIPIDS AND HBA1C  CONT ASA AND STATIN  PLEASE CALL WITH ANY QUESTIONS

## 2019-05-19 ENCOUNTER — TRANSCRIPTION ENCOUNTER (OUTPATIENT)
Age: 59
End: 2019-05-19

## 2019-05-19 VITALS
DIASTOLIC BLOOD PRESSURE: 70 MMHG | HEART RATE: 82 BPM | RESPIRATION RATE: 18 BRPM | OXYGEN SATURATION: 98 % | SYSTOLIC BLOOD PRESSURE: 104 MMHG | TEMPERATURE: 98 F

## 2019-05-19 LAB
ALBUMIN SERPL ELPH-MCNC: 3.3 G/DL — LOW (ref 3.5–5.2)
ALP SERPL-CCNC: 127 U/L — HIGH (ref 30–115)
ALT FLD-CCNC: 33 U/L — SIGNIFICANT CHANGE UP (ref 0–41)
ANION GAP SERPL CALC-SCNC: 9 MMOL/L — SIGNIFICANT CHANGE UP (ref 7–14)
AST SERPL-CCNC: 28 U/L — SIGNIFICANT CHANGE UP (ref 0–41)
BASOPHILS # BLD AUTO: 0.01 K/UL — SIGNIFICANT CHANGE UP (ref 0–0.2)
BASOPHILS NFR BLD AUTO: 0.3 % — SIGNIFICANT CHANGE UP (ref 0–1)
BILIRUB SERPL-MCNC: 0.3 MG/DL — SIGNIFICANT CHANGE UP (ref 0.2–1.2)
BUN SERPL-MCNC: 15 MG/DL — SIGNIFICANT CHANGE UP (ref 10–20)
CALCIUM SERPL-MCNC: 8.7 MG/DL — SIGNIFICANT CHANGE UP (ref 8.5–10.1)
CHLORIDE SERPL-SCNC: 107 MMOL/L — SIGNIFICANT CHANGE UP (ref 98–110)
CO2 SERPL-SCNC: 27 MMOL/L — SIGNIFICANT CHANGE UP (ref 17–32)
CREAT SERPL-MCNC: 0.5 MG/DL — LOW (ref 0.7–1.5)
EOSINOPHIL # BLD AUTO: 0.08 K/UL — SIGNIFICANT CHANGE UP (ref 0–0.7)
EOSINOPHIL NFR BLD AUTO: 2.7 % — SIGNIFICANT CHANGE UP (ref 0–8)
GLUCOSE SERPL-MCNC: 102 MG/DL — HIGH (ref 70–99)
HCT VFR BLD CALC: 36.4 % — LOW (ref 37–47)
HCV AB S/CO SERPL IA: 14.15 S/CO — HIGH (ref 0–0.99)
HCV AB SERPL-IMP: REACTIVE
HGB BLD-MCNC: 11.4 G/DL — LOW (ref 12–16)
IMM GRANULOCYTES NFR BLD AUTO: 0.3 % — SIGNIFICANT CHANGE UP (ref 0.1–0.3)
LYMPHOCYTES # BLD AUTO: 0.83 K/UL — LOW (ref 1.2–3.4)
LYMPHOCYTES # BLD AUTO: 27.9 % — SIGNIFICANT CHANGE UP (ref 20.5–51.1)
MAGNESIUM SERPL-MCNC: 1.8 MG/DL — SIGNIFICANT CHANGE UP (ref 1.8–2.4)
MCHC RBC-ENTMCNC: 27.5 PG — SIGNIFICANT CHANGE UP (ref 27–31)
MCHC RBC-ENTMCNC: 31.3 G/DL — LOW (ref 32–37)
MCV RBC AUTO: 87.7 FL — SIGNIFICANT CHANGE UP (ref 81–99)
MONOCYTES # BLD AUTO: 0.39 K/UL — SIGNIFICANT CHANGE UP (ref 0.1–0.6)
MONOCYTES NFR BLD AUTO: 13.1 % — HIGH (ref 1.7–9.3)
NEUTROPHILS # BLD AUTO: 1.65 K/UL — SIGNIFICANT CHANGE UP (ref 1.4–6.5)
NEUTROPHILS NFR BLD AUTO: 55.7 % — SIGNIFICANT CHANGE UP (ref 42.2–75.2)
NRBC # BLD: 0 /100 WBCS — SIGNIFICANT CHANGE UP (ref 0–0)
PLATELET # BLD AUTO: 131 K/UL — SIGNIFICANT CHANGE UP (ref 130–400)
POTASSIUM SERPL-MCNC: 4.4 MMOL/L — SIGNIFICANT CHANGE UP (ref 3.5–5)
POTASSIUM SERPL-SCNC: 4.4 MMOL/L — SIGNIFICANT CHANGE UP (ref 3.5–5)
PROT SERPL-MCNC: 5.7 G/DL — LOW (ref 6–8)
RBC # BLD: 4.15 M/UL — LOW (ref 4.2–5.4)
RBC # FLD: 13.1 % — SIGNIFICANT CHANGE UP (ref 11.5–14.5)
SODIUM SERPL-SCNC: 143 MMOL/L — SIGNIFICANT CHANGE UP (ref 135–146)
TSH SERPL-MCNC: 1.27 UIU/ML — SIGNIFICANT CHANGE UP (ref 0.27–4.2)
VIT B12 SERPL-MCNC: 449 PG/ML — SIGNIFICANT CHANGE UP (ref 232–1245)
WBC # BLD: 2.97 K/UL — LOW (ref 4.8–10.8)
WBC # FLD AUTO: 2.97 K/UL — LOW (ref 4.8–10.8)

## 2019-05-19 PROCEDURE — 99232 SBSQ HOSP IP/OBS MODERATE 35: CPT

## 2019-05-19 PROCEDURE — 95819 EEG AWAKE AND ASLEEP: CPT | Mod: 26

## 2019-05-19 RX ORDER — GABAPENTIN 400 MG/1
1 CAPSULE ORAL
Qty: 0 | Refills: 0 | DISCHARGE
Start: 2019-05-19

## 2019-05-19 RX ORDER — CYCLOBENZAPRINE HYDROCHLORIDE 10 MG/1
1 TABLET, FILM COATED ORAL
Qty: 0 | Refills: 0 | DISCHARGE

## 2019-05-19 RX ORDER — CLONAZEPAM 1 MG
1 TABLET ORAL
Qty: 60 | Refills: 0
Start: 2019-05-19 | End: 2019-06-17

## 2019-05-19 RX ORDER — ATORVASTATIN CALCIUM 80 MG/1
1 TABLET, FILM COATED ORAL
Qty: 30 | Refills: 0
Start: 2019-05-19 | End: 2019-06-17

## 2019-05-19 RX ORDER — GABAPENTIN 400 MG/1
4 CAPSULE ORAL
Qty: 0 | Refills: 0 | DISCHARGE

## 2019-05-19 RX ORDER — CLONAZEPAM 1 MG
1 TABLET ORAL
Qty: 0 | Refills: 0 | DISCHARGE
Start: 2019-05-19

## 2019-05-19 RX ORDER — ASPIRIN/CALCIUM CARB/MAGNESIUM 324 MG
1 TABLET ORAL
Qty: 30 | Refills: 0
Start: 2019-05-19 | End: 2019-06-17

## 2019-05-19 RX ORDER — CLONAZEPAM 0.5 MG/1
1 TABLET ORAL
Qty: 60 | Refills: 0 | DISCHARGE
Start: 2019-05-19 | End: 2019-06-17

## 2019-05-19 RX ORDER — CLONAZEPAM 1 MG
1 TABLET ORAL
Qty: 0 | Refills: 0 | DISCHARGE

## 2019-05-19 RX ADMIN — Medication 0.5 MILLIGRAM(S): at 11:11

## 2019-05-19 RX ADMIN — Medication 100 MILLIGRAM(S): at 11:11

## 2019-05-19 RX ADMIN — GABAPENTIN 300 MILLIGRAM(S): 400 CAPSULE ORAL at 11:11

## 2019-05-19 RX ADMIN — Medication 81 MILLIGRAM(S): at 11:11

## 2019-05-19 NOTE — DISCHARGE NOTE PROVIDER - NSDCCPCAREPLAN_GEN_ALL_CORE_FT
PRINCIPAL DISCHARGE DIAGNOSIS  Diagnosis: Slurred speech  Assessment and Plan of Treatment: This is likely medication induced. Your dose of Gabapentin and Klonopin was lowered. Please refer to your medication list for further instructions. Follow-up with your Neurologist in 2 weeks.      SECONDARY DISCHARGE DIAGNOSES  Diagnosis: Marijuana intoxication  Assessment and Plan of Treatment: Please abstain from the use of marijuana.

## 2019-05-19 NOTE — DISCHARGE NOTE PROVIDER - HOSPITAL COURSE
59 years old female pt with past medical hx of hep c never treated, osteonecrosis of bilateral knees and prediabetes came to ER because of slurring of speech.    As per patient for last 1 week, her boy friend and friends have noticed some slurring in her speech, but this evening, she felt she couldnt speak, denies any facial droop, chocking, no weakness, no chest pain, no palpitation, no visual symptoms, no headaches.    she has been feeling generalized weak for 1 week, but denies any weight changes.    she is taking a lot of sedatives for her pain but their dose has been unchanged for last 1 years, no recent change in her medication.    She denies taking any illicit drug, but as per nurse and ED doctor, after slurring of speech, she went out to smoke marijuana but didnt notice any improvement and came to ER.    In ER stroke code called, ct head was unremarkable, initial NIHSS 2        Gabapentin was reduced to 300 mg BID. Clonazepam was reduced to 0.5 mg BID from 3 mg. CT Head was negative. EEG was negative. MRI and MRA head was negative. She is to be discharged with Aspirin and Statin. Neurology follow-up in 2 weeks. Slurring of speech for evaluation likely medication induced. 59 years old female pt with past medical hx of hep c never treated, osteonecrosis of bilateral knees and prediabetes came to ER because of slurring of speech. As per patient for last 1 week, her boy friend and friends have noticed some slurring in her speech, but this evening, she felt she couldnt speak, denies any facial droop, chocking, no weakness, no chest pain, no palpitation, no visual symptoms, no headaches. She has been feeling generalized weak for 1 week, but denies any weight changes. She is taking a lot of sedatives for her pain but their dose has been unchanged for last 1 years, no recent change in her medication. She denies taking any illicit drug, but as per nurse and ED doctor, after slurring of speech, she went out to smoke marijuana but didnt notice any improvement and came to ER. In ER stroke code called, ct head was unremarkable, initial NIHSS 2        Gabapentin was reduced to 300 mg BID. Clonazepam was reduced to 0.5 mg BID from 3 mg. CT Head was negative. EEG was negative. MRI and MRA head was negative. She is to be discharged with Aspirin and Statin. Neurology follow-up in 2 weeks. Slurring of speech for evaluation likely medication induced.

## 2019-05-19 NOTE — PROGRESS NOTE ADULT - SUBJECTIVE AND OBJECTIVE BOX
Neurology Follow up note    Name  ALFONZO WANG    HPI:  59 years old female pt with past medical hx of hep c never treated, osteonecrosis of bilateral knees and prediabetes came to ER because of slurring of speech.  As per patient for last 1 week, her boy friend and friends have noticed some slurring in her speech, but this evening, she felt she couldnt speak, denies any facial droop, chocking, no weakness, no chest pain, no palpitation, no visual symptoms, no headaches.  she has been feeling generalized weak for 1 week, but denies any weight changes.  she is taking a lot of sedatives for her pain but their dose has been unchanged for last 1 years, no recent change in her medication.  She denies taking any illicit drug, but as per nurse and ED doctor, after slurring of speech, she went out to smoke marijuana but didnt notice any improvement and came to ER.  In ER stroke code called, ct head was unremarkable, initial NIHSS 2 (17 May 2019 22:10)      Interval History:    patient is stable and non focal   no further dysarthria  mri/a done and pending report    Vital Signs Last 24 Hrs  T(C): 36.7 (19 May 2019 08:00), Max: 36.7 (19 May 2019 08:00)  T(F): 98 (19 May 2019 08:00), Max: 98 (19 May 2019 08:00)  HR: 90 (19 May 2019 08:00) (90 - 97)  BP: 134/70 (19 May 2019 08:00) (129/60 - 162/70)  BP(mean): 89 (19 May 2019 08:00) (89 - 108)  RR: 17 (19 May 2019 08:00) (16 - 20)  SpO2: 100% (19 May 2019 08:00) (99% - 100%)    Neurological Exam:   Mental status: Awake, alert and oriented x3.  Recent and remote memory intact.  Naming, repetition and comprehension intact.  Attention/concentration intact.  No dysarthria, no aphasia.  Fund of knowledge appropriate.    Cranial nerves: Pupils equally round and reactive to light, visual fields full, no nystagmus, extraocular muscles intact, V1 through V3 intact bilaterally and symmetric, face symmetric, hearing intact to finger rub, palate elevation symmetric, tongue was midline.  Motor:  MRC grading 5/5 b/l UE/LE.   strength 5/5.  Normal tone and bulk.  No abnormal movements.    Sensation: Intact to light touch, proprioception, and pinprick.   Coordination: No dysmetria on finger-to-nose and heel-to-shin.  No dysdiadokinesia.  Reflexes: 2+ in bilateral UE/LE, downgoing toes bilaterally. (-) Velarde.  Gait: Narrow and steady. No ataxia.  Romberg negative    Medications  aspirin  chewable 81 milliGRAM(s) Oral daily  atorvastatin 40 milliGRAM(s) Oral at bedtime  clonazePAM  Tablet 0.5 milliGRAM(s) Oral two times a day  cyclobenzaprine 10 milliGRAM(s) Oral three times a day PRN  gabapentin 300 milliGRAM(s) Oral two times a day  traZODone 100 milliGRAM(s) Oral two times a day      Lab  05-19    143  |  107  |  15  ----------------------------<  102<H>  4.4   |  27  |  0.5<L>    Ca    8.7      19 May 2019 06:06  Phos  3.8     05-18  Mg     1.8     05-19    TPro  5.7<L>  /  Alb  3.3<L>  /  TBili  0.3  /  DBili  x   /  AST  28  /  ALT  33  /  AlkPhos  127<H>  05-19                          11.4   2.97  )-----------( 131      ( 19 May 2019 06:06 )             36.4     LIVER FUNCTIONS - ( 19 May 2019 06:06 )  Alb: 3.3 g/dL / Pro: 5.7 g/dL / ALK PHOS: 127 U/L / ALT: 33 U/L / AST: 28 U/L / GGT: x             1.8        Radiology      Assessment: 58 yo female with transient dysarthria, ? tia vs side effect of drug use  mri prelim neg for acute cva  pt is non focal    Plan:  f/u mri brain and mra  if negative, can d/c home on asa and lipitor  f/u with neuro in 2 weeks

## 2019-05-19 NOTE — DISCHARGE NOTE NURSING/CASE MANAGEMENT/SOCIAL WORK - NSDCDPATPORTLINK_GEN_ALL_CORE
You can access the [email or web address], offered by [facility], by registering with the following website: [email or web address]

## 2019-05-19 NOTE — DISCHARGE NOTE PROVIDER - CARE PROVIDER_API CALL
Nichole Rodriguez)  Neurology  [address] [place] [address], [address]  [place] [address]  Phone: [phone]  Fax: [phone]  Follow Up Time:

## 2019-05-19 NOTE — PROGRESS NOTE ADULT - ASSESSMENT
59 years old female pt with past medical hx of hep c never treated, osteonecrosis of bilateral knees and prediabetes came to ER because of slurring of speech.    Slurring of speech for evaluation likely medication induced  doubt stroke/ TIA  CTH negative  Pending Urine tox  Taper her sedatives  Gabapentin reduced to 300 mg BID  Clonazepam reduced to 0.5 mg BID, was on 3 mg daily  seen by neurology  Keep aspirin and statin for now   TSH, vitamin b12 are normal  MRI and MRA of head and neck are pending   hep c reactive and known positive hx  EEG is negative  HIV non reactive    hba1c is normal, lipid profile normal    DVT prophylaxis heparin 5000 unit TID  Ambulate as tolerated  Diet regular    DC plan based on results of MRI 59 years old female pt with past medical hx of hep c never treated, osteonecrosis of bilateral knees and prediabetes came to ER because of slurring of speech.    Slurring of speech for evaluation likely medication induced  doubt stroke/ TIA  CTH negative  Pending Urine tox  Taper her sedatives  Gabapentin reduced to 300 mg BID  Clonazepam reduced to 0.5 mg BID, was on 3 mg daily  seen by neurology  Keep aspirin and statin for now   TSH, vitamin b12 are normal  MRI and MRA of head and neck are pending   hep c reactive and known positive hx  EEG is negative  HIV non reactive    hba1c is normal, lipid profile normal    DVT prophylaxis heparin 5000 unit TID  Ambulate as tolerated  Diet regular    DC plan based on results of MRI--spent more than 30mins-- patient advised to take lipitor and aspirin for plaque in internal carotid 59 years old female pt with past medical hx of hep c never treated, osteonecrosis of bilateral knees and prediabetes came to ER because of slurring of speech.    #Slurring of speech for evaluation likely medication induced  doubt stroke/ TIA  CTH negative  Taper her sedatives  Gabapentin reduced to 300 mg BID  Clonazepam reduced to 0.5 mg BID, was on 3 mg daily  seen by neurology  Keep aspirin and statin for now   TSH, vitamin b12 are normal  MRI and MRA of head and neck are pending   EEG is negative for epilepsy  HIV non reactive    hba1c is normal, lipid profile normal  # hep c reactive and known positive hx--outpatient Gi follow up    DVT prophylaxis heparin 5000 unit TID  Ambulate as tolerated  Diet regular    DC plan based on results of MRI--spent more than 30mins-- patient advised to take lipitor and aspirin for plaque in internal carotid

## 2019-05-19 NOTE — PROGRESS NOTE ADULT - REASON FOR ADMISSION
slurring of speech that started this evening

## 2019-05-19 NOTE — PROGRESS NOTE ADULT - SUBJECTIVE AND OBJECTIVE BOX
SUBJECTIVE:    Patient is a 59y old Female who presents with a chief complaint of slurring of speech that started this evening (19 May 2019 10:53)    Currently admitted to medicine with the primary diagnosis of Slurred speech     Today is hospital day 2d.     PAST MEDICAL & SURGICAL HISTORY  Prediabetes  Cervical cancer  History of osteonecrosis  Chronic hepatitis C without hepatic coma  Status post gastric banding  S/P tonsillectomy  S/P hysterectomy    ALLERGIES:  No Known Allergies    MEDICATIONS:  STANDING MEDICATIONS  aspirin  chewable 81 milliGRAM(s) Oral daily  atorvastatin 40 milliGRAM(s) Oral at bedtime  clonazePAM  Tablet 0.5 milliGRAM(s) Oral two times a day  gabapentin 300 milliGRAM(s) Oral two times a day  traZODone 100 milliGRAM(s) Oral two times a day    PRN MEDICATIONS  cyclobenzaprine 10 milliGRAM(s) Oral three times a day PRN    VITALS:   T(F): 98  HR: 90  BP: 134/70  RR: 17  SpO2: 100%    LABS:                        11.4   2.97  )-----------( 131      ( 19 May 2019 06:06 )             36.4     05-19    143  |  107  |  15  ----------------------------<  102<H>  4.4   |  27  |  0.5<L>    Ca    8.7      19 May 2019 06:06  Phos  3.8     05-18  Mg     1.8     05-19    TPro  5.7<L>  /  Alb  3.3<L>  /  TBili  0.3  /  DBili  x   /  AST  28  /  ALT  33  /  AlkPhos  127<H>  05-19    PT/INR - ( 17 May 2019 18:04 )   PT: 11.10 sec;   INR: 0.96 ratio         PTT - ( 17 May 2019 18:04 )  PTT:35.2 sec          CARDIAC MARKERS ( 17 May 2019 18:04 )  x     / <0.01 ng/mL / 123 U/L / x     / 6.1 ng/mL      RADIOLOGY:    PHYSICAL EXAM:  GEN: No acute distress  LUNGS: Clear to auscultation bilaterally   HEART: S1/S2 present. RRR.   ABD/ GI: Soft, non-tender, non-distended. Bowel sounds present  EXT: NC/NC/NE/2+PP/GILLILAND  NEURO: AAOX3
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
## 2019-05-22 DIAGNOSIS — T50.995A ADVERSE EFFECT OF OTHER DRUGS, MEDICAMENTS AND BIOLOGICAL SUBSTANCES, INITIAL ENCOUNTER: ICD-10-CM

## 2019-05-22 DIAGNOSIS — Z98.84 BARIATRIC SURGERY STATUS: ICD-10-CM

## 2019-05-22 DIAGNOSIS — R73.03 PREDIABETES: ICD-10-CM

## 2019-05-22 DIAGNOSIS — R47.81 SLURRED SPEECH: ICD-10-CM

## 2019-05-22 DIAGNOSIS — R47.1 DYSARTHRIA AND ANARTHRIA: ICD-10-CM

## 2019-05-22 DIAGNOSIS — F12.229: ICD-10-CM

## 2019-05-22 DIAGNOSIS — M87.88 OTHER OSTEONECROSIS, OTHER SITE: ICD-10-CM

## 2019-05-22 DIAGNOSIS — R26.81 UNSTEADINESS ON FEET: ICD-10-CM

## 2019-05-22 DIAGNOSIS — F17.210 NICOTINE DEPENDENCE, CIGARETTES, UNCOMPLICATED: ICD-10-CM

## 2019-05-22 DIAGNOSIS — Z85.41 PERSONAL HISTORY OF MALIGNANT NEOPLASM OF CERVIX UTERI: ICD-10-CM

## 2019-05-22 DIAGNOSIS — B18.2 CHRONIC VIRAL HEPATITIS C: ICD-10-CM

## 2019-05-23 LAB
HCV RNA SERPL NAA DL=5-ACNC: SIGNIFICANT CHANGE UP IU/ML
HCV RNA SPEC NAA+PROBE-LOG IU: 5.43 LOGIU/ML — SIGNIFICANT CHANGE UP

## 2019-07-02 ENCOUNTER — EMERGENCY (EMERGENCY)
Facility: HOSPITAL | Age: 59
LOS: 0 days | Discharge: HOME | End: 2019-07-02
Attending: EMERGENCY MEDICINE | Admitting: EMERGENCY MEDICINE
Payer: MEDICAID

## 2019-07-02 VITALS
SYSTOLIC BLOOD PRESSURE: 113 MMHG | DIASTOLIC BLOOD PRESSURE: 68 MMHG | HEART RATE: 88 BPM | RESPIRATION RATE: 18 BRPM | OXYGEN SATURATION: 99 % | TEMPERATURE: 97 F

## 2019-07-02 VITALS
SYSTOLIC BLOOD PRESSURE: 110 MMHG | DIASTOLIC BLOOD PRESSURE: 77 MMHG | OXYGEN SATURATION: 99 % | HEART RATE: 74 BPM | RESPIRATION RATE: 18 BRPM

## 2019-07-02 DIAGNOSIS — Z79.82 LONG TERM (CURRENT) USE OF ASPIRIN: ICD-10-CM

## 2019-07-02 DIAGNOSIS — F10.129 ALCOHOL ABUSE WITH INTOXICATION, UNSPECIFIED: ICD-10-CM
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
## 2019-07-02 DIAGNOSIS — R73.03 PREDIABETES: ICD-10-CM

## 2019-07-02 DIAGNOSIS — Z98.84 BARIATRIC SURGERY STATUS: Chronic | ICD-10-CM

## 2019-07-02 DIAGNOSIS — Z90.710 ACQUIRED ABSENCE OF BOTH CERVIX AND UTERUS: Chronic | ICD-10-CM

## 2019-07-02 DIAGNOSIS — Z90.89 ACQUIRED ABSENCE OF OTHER ORGANS: Chronic | ICD-10-CM

## 2019-07-02 DIAGNOSIS — Z85.41 PERSONAL HISTORY OF MALIGNANT NEOPLASM OF CERVIX UTERI: ICD-10-CM

## 2019-07-02 DIAGNOSIS — Z79.899 OTHER LONG TERM (CURRENT) DRUG THERAPY: ICD-10-CM

## 2019-07-02 LAB
ALBUMIN SERPL ELPH-MCNC: 3.8 G/DL — SIGNIFICANT CHANGE UP (ref 3.5–5.2)
ALP SERPL-CCNC: 98 U/L — SIGNIFICANT CHANGE UP (ref 30–115)
ALT FLD-CCNC: 28 U/L — SIGNIFICANT CHANGE UP (ref 0–41)
ANION GAP SERPL CALC-SCNC: 8 MMOL/L — SIGNIFICANT CHANGE UP (ref 7–14)
APAP SERPL-MCNC: <5 UG/ML — LOW (ref 10–30)
AST SERPL-CCNC: 27 U/L — SIGNIFICANT CHANGE UP (ref 0–41)
BASOPHILS # BLD AUTO: 0.03 K/UL — SIGNIFICANT CHANGE UP (ref 0–0.2)
BASOPHILS NFR BLD AUTO: 0.8 % — SIGNIFICANT CHANGE UP (ref 0–1)
BILIRUB SERPL-MCNC: 0.9 MG/DL — SIGNIFICANT CHANGE UP (ref 0.2–1.2)
BUN SERPL-MCNC: 19 MG/DL — SIGNIFICANT CHANGE UP (ref 10–20)
CALCIUM SERPL-MCNC: 8.6 MG/DL — SIGNIFICANT CHANGE UP (ref 8.5–10.1)
CHLORIDE SERPL-SCNC: 106 MMOL/L — SIGNIFICANT CHANGE UP (ref 98–110)
CO2 SERPL-SCNC: 26 MMOL/L — SIGNIFICANT CHANGE UP (ref 17–32)
CREAT SERPL-MCNC: 0.7 MG/DL — SIGNIFICANT CHANGE UP (ref 0.7–1.5)
EOSINOPHIL # BLD AUTO: 0.05 K/UL — SIGNIFICANT CHANGE UP (ref 0–0.7)
EOSINOPHIL NFR BLD AUTO: 1.4 % — SIGNIFICANT CHANGE UP (ref 0–8)
ETHANOL SERPL-MCNC: 162 MG/DL — HIGH
GLUCOSE SERPL-MCNC: 92 MG/DL — SIGNIFICANT CHANGE UP (ref 70–99)
HCT VFR BLD CALC: 37.4 % — SIGNIFICANT CHANGE UP (ref 37–47)
HGB BLD-MCNC: 11.5 G/DL — LOW (ref 12–16)
IMM GRANULOCYTES NFR BLD AUTO: 0.6 % — HIGH (ref 0.1–0.3)
LYMPHOCYTES # BLD AUTO: 0.88 K/UL — LOW (ref 1.2–3.4)
LYMPHOCYTES # BLD AUTO: 24.6 % — SIGNIFICANT CHANGE UP (ref 20.5–51.1)
MAGNESIUM SERPL-MCNC: 1.9 MG/DL — SIGNIFICANT CHANGE UP (ref 1.8–2.4)
MCHC RBC-ENTMCNC: 27.4 PG — SIGNIFICANT CHANGE UP (ref 27–31)
MCHC RBC-ENTMCNC: 30.7 G/DL — LOW (ref 32–37)
MCV RBC AUTO: 89.3 FL — SIGNIFICANT CHANGE UP (ref 81–99)
MONOCYTES # BLD AUTO: 0.37 K/UL — SIGNIFICANT CHANGE UP (ref 0.1–0.6)
MONOCYTES NFR BLD AUTO: 10.3 % — HIGH (ref 1.7–9.3)
NEUTROPHILS # BLD AUTO: 2.23 K/UL — SIGNIFICANT CHANGE UP (ref 1.4–6.5)
NEUTROPHILS NFR BLD AUTO: 62.3 % — SIGNIFICANT CHANGE UP (ref 42.2–75.2)
NRBC # BLD: 0 /100 WBCS — SIGNIFICANT CHANGE UP (ref 0–0)
PLATELET # BLD AUTO: 153 K/UL — SIGNIFICANT CHANGE UP (ref 130–400)
POTASSIUM SERPL-MCNC: 4.8 MMOL/L — SIGNIFICANT CHANGE UP (ref 3.5–5)
POTASSIUM SERPL-SCNC: 4.8 MMOL/L — SIGNIFICANT CHANGE UP (ref 3.5–5)
PROT SERPL-MCNC: 6.5 G/DL — SIGNIFICANT CHANGE UP (ref 6–8)
RBC # BLD: 4.19 M/UL — LOW (ref 4.2–5.4)
RBC # FLD: 13.3 % — SIGNIFICANT CHANGE UP (ref 11.5–14.5)
SALICYLATES SERPL-MCNC: <0.3 MG/DL — LOW (ref 4–30)
SODIUM SERPL-SCNC: 140 MMOL/L — SIGNIFICANT CHANGE UP (ref 135–146)
WBC # BLD: 3.58 K/UL — LOW (ref 4.8–10.8)
WBC # FLD AUTO: 3.58 K/UL — LOW (ref 4.8–10.8)

## 2019-07-02 PROCEDURE — 71045 X-RAY EXAM CHEST 1 VIEW: CPT | Mod: 26

## 2019-07-02 PROCEDURE — 70450 CT HEAD/BRAIN W/O DYE: CPT | Mod: 26

## 2019-07-02 PROCEDURE — 99284 EMERGENCY DEPT VISIT MOD MDM: CPT

## 2019-07-02 PROCEDURE — 72125 CT NECK SPINE W/O DYE: CPT | Mod: 26

## 2019-07-02 PROCEDURE — 72170 X-RAY EXAM OF PELVIS: CPT | Mod: 26

## 2019-07-02 RX ORDER — ONDANSETRON 8 MG/1
4 TABLET, FILM COATED ORAL ONCE
Refills: 0 | Status: COMPLETED | OUTPATIENT
Start: 2019-07-02 | End: 2019-07-02

## 2019-07-02 RX ORDER — THIAMINE MONONITRATE (VIT B1) 100 MG
100 TABLET ORAL ONCE
Refills: 0 | Status: DISCONTINUED | OUTPATIENT
Start: 2019-07-02 | End: 2019-07-02

## 2019-07-02 RX ORDER — THIAMINE MONONITRATE (VIT B1) 100 MG
100 TABLET ORAL ONCE
Refills: 0 | Status: COMPLETED | OUTPATIENT
Start: 2019-07-02 | End: 2019-07-02

## 2019-07-02 RX ADMIN — Medication 100 MILLIGRAM(S): at 19:52

## 2019-07-02 RX ADMIN — ONDANSETRON 4 MILLIGRAM(S): 8 TABLET, FILM COATED ORAL at 20:48
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
## 2019-07-02 NOTE — ED PROVIDER NOTE - NSFOLLOWUPCLINICS_GEN_ALL_ED_FT
A Family Medicine Doctor  Family Medicine  .  [place]   Phone:   Fax:   Follow Up Time: 1-3 Days    [facility]  Detox Mgmt  [address] [place] [address]  Phone: [phone]  Fax:   Follow Up Time: 1-3 Days    [facility]  Medicine  [address], [place]   Phone: [phone]  Fax:   Follow Up Time: 1-3 Days
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
## 2019-07-02 NOTE — ED PROVIDER NOTE - NSFOLLOWUPINSTRUCTIONS_ED_ALL_ED_FT
Return to the ER for worsening or concerning symptoms.    See printed discharge information sheets for further instructions on care for your condition.    Alcohol Intoxication  Alcohol intoxication occurs when a person no longer thinks clearly or functions well (becomes impaired) after drinking alcohol. Intoxication can occur with even one drink. The level of impairment depends on:    The amount of alcohol the person had.  The person's age, gender, and weight.  How often the person drinks.  Whether the person has other medical conditions, such as diabetes, seizures, or a heart condition.    Alcohol intoxication can range in severity from mild to severe. The condition can be dangerous, especially when caused by drinking large amounts of alcohol in a short period of time (binge drinking) or if the person also took certain prescription medicines or recreational drugs.    What are the signs or symptoms?  Symptoms of mild alcohol intoxication include:    Feeling relaxed or sleepy.  Mild difficulty with:    Coordination.  Speech.  Memory.  Attention.      Symptoms of moderate alcohol intoxication include:    Extreme emotions, such as anger or sadness.  Moderate difficulty with:    Coordination.  Speech.  Memory.  Attention.      Symptoms of severe alcohol intoxication include:    Passing out.  Vomiting.  Confusion.  Slow breathing.  Coma.  Severe difficulty with:    Coordination.  Speech.  Memory.  Attention.      Intoxication, especially in people who are not exposed to alcohol often can progress from mild to severe quickly, and may even cause coma or death.    How is this diagnosed?  This condition may be diagnosed based on:    A medical history.  A physical exam.  A blood test that measures the concentration of alcohol in the blood (blood alcohol content, or BAC).  Whether there is a smell of alcohol on the breath.    Your health care provider will ask you how much alcohol you drank and what kind of alcohol you had.    How is this treated?  Usually, treatment is not needed for this condition. Most of the effects of alcohol are temporary and go away as the alcohol naturally leaves the body. Your health care provider may recommend monitoring until the alcohol level starts to drop and it is safe to go home. You may also get fluids through an IV tube to help prevent dehydration. If the intoxication is severe, a breathing machine called a ventilator may be needed to support your breathing.    Follow these instructions at home:  Do not drive after drinking alcohol.  Have someone stay with you while you are intoxicated. You should not be left alone.  Stay hydrated. Drink enough fluid to keep your urine clear or pale yellow.  Avoid caffeine because it can dehydrate you.  ImageTake over-the-counter and prescription medicines only as told by your health care provider.  How is this prevented?  To prevent alcohol intoxication:    Limit alcohol intake to no more than 1 drink a day for nonpregnant women and 2 drinks a day for men. One drink equals 12 oz of beer, 5 oz of wine, or 1½ oz of hard liquor.  Do not drink alcohol on an empty stomach.  Avoid drinking alcohol if:    You are under the legal drinking age.  You are pregnant or may be pregnant.  You are taking medicines that should not be taken with alcohol.  Your drinking causes your medical condition to get worse.  You need to drive or perform activities that require your attention.  You have substance use disorder.      To prevent potentially serious complications of alcohol intoxication, seek immediate medical care if you or someone you know has signs of moderate or severe alcohol intoxication. These include:    Moderate or severe difficulty with:    Coordination.  Speech.  Memory.  Attention.    Passing out.  Confusion.  Vomiting.    Do not leave someone alone if he or she is intoxicated.    Contact a health care provider if:  You do not feel better after a few days.  You are having problems at work, at school, or at home due to drinking.  Get help right away if:  You become shaky when you try to stop drinking.  You shake uncontrollably (have a seizure).  You vomit blood. Blood in vomit may look bright red, or it may look like coffee grounds.  You have blood in your stool. Blood in stool may be bright red, or it may make stool appear black and tarry and make it smell bad.  You become light-headed or you faint.  If you ever feel like you may hurt yourself or others, or have thoughts about taking your own life, get help right away. You can go to your nearest emergency department or call:     Your local emergency services (911 in the [place]   A suicide crisis helpline, such as the National Suicide Prevention Lifeline at 1-272.694.8923. This is open 24 hours a day.     This information is not intended to replace advice given to you by your health care provider. Make sure you discuss any questions you have with your health care provider.    Fall Prevention in the Home, Adult  Falls can cause injuries and can affect people from all age groups. There are many simple things that you can do to make your home safe and to help prevent falls. Ask for help when making these changes, if needed.    What actions can I take to prevent falls?  General instructions     Use good lighting in all rooms. Replace any light bulbs that burn out.  Turn on lights if it is dark. Use night-lights.  Place frequently used items in easy-to-reach places. Lower the shelves around your home if necessary.  Set up furniture so that there are clear paths around it. Avoid moving your furniture around.  Remove throw rugs and other tripping hazards from the floor.  Avoid walking on wet floors.  Fix any uneven floor surfaces.  Add color or contrast paint or tape to grab bars and handrails in your home. Place contrasting color strips on the first and last steps of stairways.  When you use a stepladder, make sure that it is completely opened and that the sides are firmly locked. Have someone hold the ladder while you are using it. Do not climb a closed stepladder.  Be aware of any and all pets.  What can I do in the bathroom?     Keep the floor dry. Immediately clean up any water that spills onto the floor.  Remove soap buildup in the tub or shower on a regular basis.  Use non-skid mats or decals on the floor of the tub or shower.  Attach bath mats securely with double-sided, non-slip rug tape.  If you need to sit down while you are in the shower, use a plastic, non-slip stool.  Image ImageInstall grab bars by the toilet and in the tub and shower. Do not use towel bars as grab bars.  What can I do in the bedroom?     Make sure that a bedside light is easy to reach.  Do not use oversized bedding that drapes onto the floor.  Have a firm chair that has side arms to use for getting dressed.  What can I do in the kitchen?     Clean up any spills right away.  If you need to reach for something above you, use a sturdy step stool that has a grab bar.  Keep electrical cables out of the way.  Do not use floor polish or wax that makes floors slippery. If you must use wax, make sure that it is non-skid floor wax.  What can I do in the stairways?     Do not leave any items on the stairs.  Make sure that you have a light switch at the top of the stairs and the bottom of the stairs. Have them installed if you do not have them.  Make sure that there are handrails on both sides of the stairs. Fix handrails that are broken or loose. Make sure that handrails are as long as the stairways.  Install non-slip stair treads on all stairs in your home.  Avoid having throw rugs at the top or bottom of stairways, or secure the rugs with carpet tape to prevent them from moving.  Choose a carpet design that does not hide the edge of steps on the stairway.  Check any carpeting to make sure that it is firmly attached to the stairs. Fix any carpet that is loose or worn.  What can I do on the outside of my home?     Use bright outdoor lighting.  Regularly repair the edges of walkways and driveways and fix any cracks.  Remove high doorway thresholds.  Trim any shrubbery on the main path into your home.  Regularly check that handrails are securely fastened and in good repair. Both sides of any steps should have handrails.  Install guardrails along the edges of any raised decks or porches.  Clear walkways of debris and clutter, including tools and rocks.  Have leaves, snow, and ice cleared regularly.  Use sand or salt on walkways during winter months.  In the garage, clean up any spills right away, including grease or oil spills.  What other actions can I take?     Wear closed-toe shoes that fit well and support your feet. Wear shoes that have rubber soles or low heels.  Use mobility aids as needed, such as canes, walkers, scooters, and crutches.  Review your medicines with your health care provider. Some medicines can cause dizziness or changes in blood pressure, which increase your risk of falling.  Talk with your health care provider about other ways that you can decrease your risk of falls. This may include working with a physical therapist or  to improve your strength, balance, and endurance.    Where to find more information  Centers for Disease Control and PreventionYAN: https://www.cdc.gov  National [place] on Aging: https://nv7diwx.rodrigo.nih.gov  Contact a health care provider if:  You are afraid of falling at home.  You feel weak, drowsy, or dizzy at home.  You fall at home.  Summary  There are many simple things that you can do to make your home safe and to help prevent falls.  Ways to make your home safe include removing tripping hazards and installing grab bars in the bathroom.  Ask for help when making these changes in your home.  This information is not intended to replace advice given to you by your health care provider. Make sure you discuss any questions you have with your health care provider.

## 2019-07-02 NOTE — ED ADULT NURSE NOTE - PMH
Cervical cancer    Chronic hepatitis C without hepatic coma    History of osteonecrosis    Prediabetes
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
## 2019-07-02 NOTE — ED ADULT NURSE NOTE - NSIMPLEMENTINTERV_GEN_ALL_ED
Implemented All Fall Risk Interventions:  [place] to call system. Call bell, personal items and telephone within reach. Instruct patient to call for assistance. Room bathroom lighting operational. Non-slip footwear when patient is off stretcher. Physically safe environment: no spills, clutter or unnecessary equipment. Stretcher in lowest position, wheels locked, appropriate side rails in place. Provide visual cue, wrist band, yellow gown, etc. Monitor gait and stability. Monitor for mental status changes and reorient to person, place, and time. Review medications for side effects contributing to fall risk. Reinforce activity limits and safety measures with patient and family.

## 2019-07-02 NOTE — ED PROVIDER NOTE - PHYSICAL EXAMINATION
Physical Exam  General: Sleepy but arouseable, NAD, WDWN, NCAT, no skull/facial tender, no step-offs, no raccoon/cavazos, non-toxic appearing  Eyes: PERRL, EOMI, no icterus, lids and conjunctivae are normal  ENT: External inspection normal, pink/moist membranes, pharynx normal  CV: S1S2, regular rate and rhythm, no murmur/gallops/rubs, no JVD, 2+ pulses b/l, no edema/cords/homans, warm/well-perfused  Respiratory: Normal respiratory rate/effort, no respiratory distress, lungs clear to auscultation b/l, no wheezing/rales/rhonchi, no retractions, no stridor  Abdomen: Soft abdomen, no tender/distended/guarding/rebound, no CVA tender  Musculoskeletal: FROM all 4 extremities, N/V intact, pelvis stable, no TLS spinal tender/deform/step-offs, no vincent tender/deform  Neck: FROM neck, supple, no meningismus, trachea midline, no JVD, no cspine tender/step-offs  Integumentary: Color normal for race, warm and dry, no rash. No lacerations, abrasions, or ecchymosis.   Neuro: Sleepy but arouseable, slurred speech, moderate intoxicated, CN 2-12 grossly intact, moving all 4 ext spontaneously and in response to touch/command, no clonus/rigidity/hyper-reflexia, no tremor

## 2019-07-03 PROBLEM — Z87.39 PERSONAL HISTORY OF OTHER DISEASES OF THE MUSCULOSKELETAL SYSTEM AND CONNECTIVE TISSUE: Chronic | Status: ACTIVE | Noted: 2019-05-17

## 2019-07-03 PROBLEM — B18.2 CHRONIC VIRAL HEPATITIS C: Chronic | Status: ACTIVE | Noted: 2019-05-17

## 2019-07-03 PROBLEM — R73.03 PREDIABETES: Chronic | Status: ACTIVE | Noted: 2019-05-17

## 2019-07-03 PROBLEM — C53.9 MALIGNANT NEOPLASM OF CERVIX UTERI, UNSPECIFIED: Chronic | Status: ACTIVE | Noted: 2019-05-17

## 2019-11-17 ENCOUNTER — EMERGENCY (EMERGENCY)
Facility: HOSPITAL | Age: 59
LOS: 0 days | Discharge: HOME | End: 2019-11-17
Admitting: EMERGENCY MEDICINE
Payer: COMMERCIAL

## 2019-11-17 VITALS — HEART RATE: 98 BPM

## 2019-11-17 VITALS
WEIGHT: 154.98 LBS | DIASTOLIC BLOOD PRESSURE: 66 MMHG | SYSTOLIC BLOOD PRESSURE: 132 MMHG | OXYGEN SATURATION: 97 % | TEMPERATURE: 100 F | HEART RATE: 110 BPM | RESPIRATION RATE: 19 BRPM

## 2019-11-17 DIAGNOSIS — Z90.89 ACQUIRED ABSENCE OF OTHER ORGANS: Chronic | ICD-10-CM
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
## 2019-11-17 DIAGNOSIS — M54.5 LOW BACK PAIN: ICD-10-CM

## 2019-11-17 DIAGNOSIS — Z98.84 BARIATRIC SURGERY STATUS: Chronic | ICD-10-CM

## 2019-11-17 DIAGNOSIS — M25.551 PAIN IN RIGHT HIP: ICD-10-CM
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
## 2019-11-17 DIAGNOSIS — Z90.710 ACQUIRED ABSENCE OF BOTH CERVIX AND UTERUS: Chronic | ICD-10-CM

## 2019-11-17 PROCEDURE — 72170 X-RAY EXAM OF PELVIS: CPT | Mod: 26

## 2019-11-17 PROCEDURE — 99283 EMERGENCY DEPT VISIT LOW MDM: CPT

## 2019-11-17 RX ORDER — KETOROLAC TROMETHAMINE 30 MG/ML
15 SYRINGE (ML) INJECTION ONCE
Refills: 0 | Status: DISCONTINUED | OUTPATIENT
Start: 2019-11-17 | End: 2019-11-17

## 2019-11-17 RX ORDER — METHOCARBAMOL 500 MG/1
1 TABLET, FILM COATED ORAL
Qty: 12 | Refills: 0
Start: 2019-11-17 | End: 2019-11-20

## 2019-11-17 RX ORDER — METHOCARBAMOL 500 MG/1
1500 TABLET, FILM COATED ORAL ONCE
Refills: 0 | Status: COMPLETED | OUTPATIENT
Start: 2019-11-17 | End: 2019-11-17

## 2019-11-17 RX ADMIN — METHOCARBAMOL 1500 MILLIGRAM(S): 500 TABLET, FILM COATED ORAL at 21:00

## 2019-11-17 RX ADMIN — Medication 15 MILLIGRAM(S): at 21:00

## 2019-11-17 NOTE — ED PROVIDER NOTE - CARE PROVIDER_API CALL
Hubert Colvin (MD)  Orthopaedic Surgery  [address] [place] [address]  Phone: [phone]  Fax: [phone]  Follow Up Time: 1-3 Days

## 2019-11-17 NOTE — ED PROVIDER NOTE - NS ED ROS FT
Constitutional: (-) weakness  Eyes: (-) visual changes  Cardiovascular: (-) chest pain, (-) syncope  Respiratory: (-) cough, (-) shortness of breath, (-) dyspnea,   Gastrointestinal: (-) vomiting, (-) diarrhea, (-)nausea,  Musculoskeletal: (-) neck pain, (+) back pain, (+) right hip pain  Integumentary: (-) rash, (-) edema, (-) bruises  Neurological: (-) headache, (-) loc, (-) dizziness, (-) tingling, (-)numbness,  Peripheral Vascular: (-) leg swelling  :  (-)dysuria,  (-) hematuria  Allergic/Immunologic: (-) pruritus
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
## 2019-11-17 NOTE — ED PROVIDER NOTE - PATIENT PORTAL LINK FT
You can access the FollowMyHealth Patient Portal offered by [facility] by registering at the following website: [email or web address]. By joining [facility]’s FollowMyHealth portal, you will also be able to view your health information using other applications (apps) compatible with our system.

## 2019-11-17 NOTE — ED PROVIDER NOTE - NSFOLLOWUPCLINICS_GEN_ALL_ED_FT
[facility] ([facility])  Rehabilitation  [facility] [address], [address] [place] [address]  Phone: [phone]  Fax:   Follow Up Time: 1-3 Days

## 2019-11-17 NOTE — ED PROVIDER NOTE - PHYSICAL EXAMINATION
Constitutional: Well developed, well nourished. NAD  TRAUMA: ABC intact. GCS 15.  Head: Normocephalic, atraumatic.  Eyes: PERRL. EOMI. No Raccoon eyes.   ENT: No nasal discharge. No septal hematoma. No Acosta sign. Mucous membranes moist.  Neck: Supple. Painless ROM. No midline tenderness or stepoffs.  Cardiovascular: Normal S1, S2. Regular rate and rhythm. No murmurs, rubs, or gallops.  Pulmonary: Normal respiratory rate and effort. Lungs clear to auscultation bilaterally. No wheezing, rales, or rhonchi.  CHEST: No chest wall tenderness or crepitus.  Abdominal: Soft. Nondistended. Nontender. No rebound, guarding, or rigidity.  BACK: No midline T/L/S tenderness or stepoffs. No saddle paresthesia. +right lumbar paraspinal ttp  Extremities. Pelvis stable. No traumatic deformities or tenderness of extremities.  Skin: No rashes, cyanosis, lacerations, or abrasions.  Neuro: AAOx3. Strength 5/5 in all extremities. Sensation intact throughout. No focal neurological deficits. normal gait

## 2019-12-07 ENCOUNTER — INPATIENT (INPATIENT)
Facility: HOSPITAL | Age: 59
LOS: 1 days | Discharge: HOME | End: 2019-12-09
Attending: HOSPITALIST | Admitting: HOSPITALIST
Payer: MEDICAID

## 2019-12-07 VITALS
DIASTOLIC BLOOD PRESSURE: 85 MMHG | RESPIRATION RATE: 8 BRPM | TEMPERATURE: 98 F | OXYGEN SATURATION: 97 % | WEIGHT: 179.9 LBS | SYSTOLIC BLOOD PRESSURE: 154 MMHG | HEART RATE: 86 BPM

## 2019-12-07 DIAGNOSIS — Z90.89 ACQUIRED ABSENCE OF OTHER ORGANS: Chronic | ICD-10-CM

## 2019-12-07 DIAGNOSIS — Z90.710 ACQUIRED ABSENCE OF BOTH CERVIX AND UTERUS: Chronic | ICD-10-CM

## 2019-12-07 DIAGNOSIS — Z98.84 BARIATRIC SURGERY STATUS: Chronic | ICD-10-CM

## 2019-12-07 DIAGNOSIS — R74.0 NONSPECIFIC ELEVATION OF LEVELS OF TRANSAMINASE AND LACTIC ACID DEHYDROGENASE [LDH]: ICD-10-CM

## 2019-12-07 PROCEDURE — 99291 CRITICAL CARE FIRST HOUR: CPT

## 2019-12-07 PROCEDURE — 93010 ELECTROCARDIOGRAM REPORT: CPT

## 2019-12-07 RX ADMIN — NALOXONE HYDROCHLORIDE 0.4 MILLIGRAM(S): 4 SPRAY NASAL at 23:20

## 2019-12-07 NOTE — ED ADULT TRIAGE NOTE - CHIEF COMPLAINT QUOTE
Pt came with altered mental status, was found in her car by the boyfriend. Pt has bradypnea on arrival to ER and barely responding to voice. Pt stated she took her boyfriend's Oxycodone 30 mg ER x 3 tablets.

## 2019-12-08 LAB
ALBUMIN SERPL ELPH-MCNC: 3.6 G/DL — SIGNIFICANT CHANGE UP (ref 3.5–5.2)
ALBUMIN SERPL ELPH-MCNC: 4.2 G/DL — SIGNIFICANT CHANGE UP (ref 3.5–5.2)
ALP SERPL-CCNC: 328 U/L — HIGH (ref 30–115)
ALP SERPL-CCNC: 344 U/L — HIGH (ref 30–115)
ALT FLD-CCNC: 107 U/L — HIGH (ref 0–41)
ALT FLD-CCNC: 97 U/L — HIGH (ref 0–41)
ANION GAP SERPL CALC-SCNC: 13 MMOL/L — SIGNIFICANT CHANGE UP (ref 7–14)
ANION GAP SERPL CALC-SCNC: 16 MMOL/L — HIGH (ref 7–14)
APAP SERPL-MCNC: <5 UG/ML — LOW (ref 10–30)
APPEARANCE UR: CLEAR — SIGNIFICANT CHANGE UP
APTT BLD: 27.5 SEC — SIGNIFICANT CHANGE UP (ref 27–39.2)
AST SERPL-CCNC: 113 U/L — HIGH (ref 0–41)
AST SERPL-CCNC: 140 U/L — HIGH (ref 0–41)
BACTERIA # UR AUTO: ABNORMAL
BASE EXCESS BLDA CALC-SCNC: 0.3 MMOL/L — SIGNIFICANT CHANGE UP (ref -2–2)
BASE EXCESS BLDV CALC-SCNC: -1.1 MMOL/L — SIGNIFICANT CHANGE UP (ref -2–2)
BASOPHILS # BLD AUTO: 0.01 K/UL — SIGNIFICANT CHANGE UP (ref 0–0.2)
BASOPHILS # BLD AUTO: 0.01 K/UL — SIGNIFICANT CHANGE UP (ref 0–0.2)
BASOPHILS NFR BLD AUTO: 0.3 % — SIGNIFICANT CHANGE UP (ref 0–1)
BASOPHILS NFR BLD AUTO: 0.4 % — SIGNIFICANT CHANGE UP (ref 0–1)
BILIRUB DIRECT SERPL-MCNC: 0.7 MG/DL — HIGH (ref 0–0.2)
BILIRUB INDIRECT FLD-MCNC: 0.2 MG/DL — SIGNIFICANT CHANGE UP (ref 0.2–1.2)
BILIRUB SERPL-MCNC: 0.6 MG/DL — SIGNIFICANT CHANGE UP (ref 0.2–1.2)
BILIRUB SERPL-MCNC: 0.9 MG/DL — SIGNIFICANT CHANGE UP (ref 0.2–1.2)
BILIRUB UR-MCNC: NEGATIVE — SIGNIFICANT CHANGE UP
BLD GP AB SCN SERPL QL: SIGNIFICANT CHANGE UP
BUN SERPL-MCNC: 15 MG/DL — SIGNIFICANT CHANGE UP (ref 10–20)
BUN SERPL-MCNC: 19 MG/DL — SIGNIFICANT CHANGE UP (ref 10–20)
CA-I SERPL-SCNC: 1.24 MMOL/L — SIGNIFICANT CHANGE UP (ref 1.12–1.3)
CALCIUM SERPL-MCNC: 8.6 MG/DL — SIGNIFICANT CHANGE UP (ref 8.5–10.1)
CALCIUM SERPL-MCNC: 9 MG/DL — SIGNIFICANT CHANGE UP (ref 8.5–10.1)
CHLORIDE SERPL-SCNC: 102 MMOL/L — SIGNIFICANT CHANGE UP (ref 98–110)
CHLORIDE SERPL-SCNC: 106 MMOL/L — SIGNIFICANT CHANGE UP (ref 98–110)
CHOLEST SERPL-MCNC: 117 MG/DL — SIGNIFICANT CHANGE UP (ref 100–200)
CK MB CFR SERPL CALC: 2 NG/ML — SIGNIFICANT CHANGE UP (ref 0.6–6.3)
CK SERPL-CCNC: 52 U/L — SIGNIFICANT CHANGE UP (ref 0–225)
CO2 SERPL-SCNC: 19 MMOL/L — SIGNIFICANT CHANGE UP (ref 17–32)
CO2 SERPL-SCNC: 23 MMOL/L — SIGNIFICANT CHANGE UP (ref 17–32)
COLOR SPEC: YELLOW — SIGNIFICANT CHANGE UP
CREAT SERPL-MCNC: 0.7 MG/DL — SIGNIFICANT CHANGE UP (ref 0.7–1.5)
CREAT SERPL-MCNC: 1 MG/DL — SIGNIFICANT CHANGE UP (ref 0.7–1.5)
DIFF PNL FLD: NEGATIVE — SIGNIFICANT CHANGE UP
EOSINOPHIL # BLD AUTO: 0.04 K/UL — SIGNIFICANT CHANGE UP (ref 0–0.7)
EOSINOPHIL # BLD AUTO: 0.08 K/UL — SIGNIFICANT CHANGE UP (ref 0–0.7)
EOSINOPHIL NFR BLD AUTO: 1.5 % — SIGNIFICANT CHANGE UP (ref 0–8)
EOSINOPHIL NFR BLD AUTO: 2.6 % — SIGNIFICANT CHANGE UP (ref 0–8)
EPI CELLS # UR: 3 /HPF — SIGNIFICANT CHANGE UP (ref 0–5)
ETHANOL SERPL-MCNC: <10 MG/DL — SIGNIFICANT CHANGE UP
GAS PNL BLDA: SIGNIFICANT CHANGE UP
GAS PNL BLDA: SIGNIFICANT CHANGE UP
GAS PNL BLDV: 139 MMOL/L — SIGNIFICANT CHANGE UP (ref 136–145)
GAS PNL BLDV: SIGNIFICANT CHANGE UP
GAS PNL BLDV: SIGNIFICANT CHANGE UP
GLUCOSE BLDC GLUCOMTR-MCNC: 66 MG/DL — LOW (ref 70–99)
GLUCOSE BLDC GLUCOMTR-MCNC: 92 MG/DL — SIGNIFICANT CHANGE UP (ref 70–99)
GLUCOSE BLDC GLUCOMTR-MCNC: 96 MG/DL — SIGNIFICANT CHANGE UP (ref 70–99)
GLUCOSE SERPL-MCNC: 111 MG/DL — HIGH (ref 70–99)
GLUCOSE SERPL-MCNC: 97 MG/DL — SIGNIFICANT CHANGE UP (ref 70–99)
GLUCOSE UR QL: NEGATIVE — SIGNIFICANT CHANGE UP
HCG SERPL QL: NEGATIVE — SIGNIFICANT CHANGE UP
HCO3 BLDA-SCNC: 27 MMOL/L — SIGNIFICANT CHANGE UP (ref 23–27)
HCO3 BLDV-SCNC: 29 MMOL/L — SIGNIFICANT CHANGE UP (ref 22–29)
HCT VFR BLD CALC: 34 % — LOW (ref 37–47)
HCT VFR BLD CALC: 39.9 % — SIGNIFICANT CHANGE UP (ref 37–47)
HCT VFR BLDA CALC: 45.1 % — HIGH (ref 34–44)
HDLC SERPL-MCNC: 43 MG/DL — LOW
HGB BLD CALC-MCNC: 14.7 G/DL — SIGNIFICANT CHANGE UP (ref 14–18)
HGB BLD-MCNC: 10.2 G/DL — LOW (ref 12–16)
HGB BLD-MCNC: 12 G/DL — SIGNIFICANT CHANGE UP (ref 12–16)
HOROWITZ INDEX BLDA+IHG-RTO: 28 — SIGNIFICANT CHANGE UP
HYALINE CASTS # UR AUTO: 0 /LPF — SIGNIFICANT CHANGE UP (ref 0–7)
IMM GRANULOCYTES NFR BLD AUTO: 0 % — LOW (ref 0.1–0.3)
IMM GRANULOCYTES NFR BLD AUTO: 0.3 % — SIGNIFICANT CHANGE UP (ref 0.1–0.3)
INR BLD: 1 RATIO — SIGNIFICANT CHANGE UP (ref 0.65–1.3)
KETONES UR-MCNC: NEGATIVE — SIGNIFICANT CHANGE UP
LACTATE BLDV-MCNC: 0.8 MMOL/L — SIGNIFICANT CHANGE UP (ref 0.5–1.6)
LEUKOCYTE ESTERASE UR-ACNC: ABNORMAL
LIPID PNL WITH DIRECT LDL SERPL: 62 MG/DL — SIGNIFICANT CHANGE UP (ref 4–129)
LYMPHOCYTES # BLD AUTO: 0.47 K/UL — LOW (ref 1.2–3.4)
LYMPHOCYTES # BLD AUTO: 0.94 K/UL — LOW (ref 1.2–3.4)
LYMPHOCYTES # BLD AUTO: 17.1 % — LOW (ref 20.5–51.1)
LYMPHOCYTES # BLD AUTO: 30.7 % — SIGNIFICANT CHANGE UP (ref 20.5–51.1)
MAGNESIUM SERPL-MCNC: 1.8 MG/DL — SIGNIFICANT CHANGE UP (ref 1.8–2.4)
MCHC RBC-ENTMCNC: 27.3 PG — SIGNIFICANT CHANGE UP (ref 27–31)
MCHC RBC-ENTMCNC: 27.6 PG — SIGNIFICANT CHANGE UP (ref 27–31)
MCHC RBC-ENTMCNC: 30 G/DL — LOW (ref 32–37)
MCHC RBC-ENTMCNC: 30.1 G/DL — LOW (ref 32–37)
MCV RBC AUTO: 91.2 FL — SIGNIFICANT CHANGE UP (ref 81–99)
MCV RBC AUTO: 91.7 FL — SIGNIFICANT CHANGE UP (ref 81–99)
MONOCYTES # BLD AUTO: 0.3 K/UL — SIGNIFICANT CHANGE UP (ref 0.1–0.6)
MONOCYTES # BLD AUTO: 0.34 K/UL — SIGNIFICANT CHANGE UP (ref 0.1–0.6)
MONOCYTES NFR BLD AUTO: 10.9 % — HIGH (ref 1.7–9.3)
MONOCYTES NFR BLD AUTO: 11.1 % — HIGH (ref 1.7–9.3)
NEUTROPHILS # BLD AUTO: 1.68 K/UL — SIGNIFICANT CHANGE UP (ref 1.4–6.5)
NEUTROPHILS # BLD AUTO: 1.93 K/UL — SIGNIFICANT CHANGE UP (ref 1.4–6.5)
NEUTROPHILS NFR BLD AUTO: 55 % — SIGNIFICANT CHANGE UP (ref 42.2–75.2)
NEUTROPHILS NFR BLD AUTO: 70.1 % — SIGNIFICANT CHANGE UP (ref 42.2–75.2)
NITRITE UR-MCNC: POSITIVE
NRBC # BLD: 0 /100 WBCS — SIGNIFICANT CHANGE UP (ref 0–0)
NRBC # BLD: 0 /100 WBCS — SIGNIFICANT CHANGE UP (ref 0–0)
PCO2 BLDA: 49 MMHG — HIGH (ref 38–42)
PCO2 BLDV: 73 MMHG — HIGH (ref 41–51)
PH BLDA: 7.34 — LOW (ref 7.38–7.42)
PH BLDV: 7.21 — LOW (ref 7.26–7.43)
PH UR: 6 — SIGNIFICANT CHANGE UP (ref 5–8)
PHOSPHATE SERPL-MCNC: 3.7 MG/DL — SIGNIFICANT CHANGE UP (ref 2.1–4.9)
PLATELET # BLD AUTO: 154 K/UL — SIGNIFICANT CHANGE UP (ref 130–400)
PLATELET # BLD AUTO: 99 K/UL — LOW (ref 130–400)
PO2 BLDA: 81 MMHG — SIGNIFICANT CHANGE UP (ref 78–95)
PO2 BLDV: 38 MMHG — SIGNIFICANT CHANGE UP (ref 20–40)
POTASSIUM BLDV-SCNC: 4.8 MMOL/L — SIGNIFICANT CHANGE UP (ref 3.3–5.6)
POTASSIUM SERPL-MCNC: 4.8 MMOL/L — SIGNIFICANT CHANGE UP (ref 3.5–5)
POTASSIUM SERPL-MCNC: 4.9 MMOL/L — SIGNIFICANT CHANGE UP (ref 3.5–5)
POTASSIUM SERPL-SCNC: 4.8 MMOL/L — SIGNIFICANT CHANGE UP (ref 3.5–5)
POTASSIUM SERPL-SCNC: 4.9 MMOL/L — SIGNIFICANT CHANGE UP (ref 3.5–5)
PROT SERPL-MCNC: 6.3 G/DL — SIGNIFICANT CHANGE UP (ref 6–8)
PROT SERPL-MCNC: 7.4 G/DL — SIGNIFICANT CHANGE UP (ref 6–8)
PROT UR-MCNC: NEGATIVE — SIGNIFICANT CHANGE UP
PROTHROM AB SERPL-ACNC: 11.5 SEC — SIGNIFICANT CHANGE UP (ref 9.95–12.87)
RBC # BLD: 3.73 M/UL — LOW (ref 4.2–5.4)
RBC # BLD: 4.35 M/UL — SIGNIFICANT CHANGE UP (ref 4.2–5.4)
RBC # FLD: 13.8 % — SIGNIFICANT CHANGE UP (ref 11.5–14.5)
RBC # FLD: 13.9 % — SIGNIFICANT CHANGE UP (ref 11.5–14.5)
RBC CASTS # UR COMP ASSIST: 2 /HPF — SIGNIFICANT CHANGE UP (ref 0–4)
SALICYLATES SERPL-MCNC: <0.3 MG/DL — LOW (ref 4–30)
SAO2 % BLDA: 95 % — SIGNIFICANT CHANGE UP (ref 94–98)
SAO2 % BLDV: 63 % — SIGNIFICANT CHANGE UP
SODIUM SERPL-SCNC: 138 MMOL/L — SIGNIFICANT CHANGE UP (ref 135–146)
SODIUM SERPL-SCNC: 141 MMOL/L — SIGNIFICANT CHANGE UP (ref 135–146)
SP GR SPEC: 1.01 — SIGNIFICANT CHANGE UP (ref 1.01–1.02)
TOTAL CHOLESTEROL/HDL RATIO MEASUREMENT: 2.7 RATIO — LOW (ref 4–5.5)
TRIGL SERPL-MCNC: 72 MG/DL — SIGNIFICANT CHANGE UP (ref 10–149)
TROPONIN T SERPL-MCNC: <0.01 NG/ML — SIGNIFICANT CHANGE UP
UROBILINOGEN FLD QL: SIGNIFICANT CHANGE UP
WBC # BLD: 2.75 K/UL — LOW (ref 4.8–10.8)
WBC # BLD: 3.06 K/UL — LOW (ref 4.8–10.8)
WBC # FLD AUTO: 2.75 K/UL — LOW (ref 4.8–10.8)
WBC # FLD AUTO: 3.06 K/UL — LOW (ref 4.8–10.8)
WBC UR QL: 15 /HPF — HIGH (ref 0–5)

## 2019-12-08 PROCEDURE — 71045 X-RAY EXAM CHEST 1 VIEW: CPT | Mod: 26

## 2019-12-08 PROCEDURE — 99451 NTRPROF PH1/NTRNET/EHR 5/>: CPT

## 2019-12-08 RX ORDER — NALOXONE HYDROCHLORIDE 4 MG/.1ML
0.4 SPRAY NASAL ONCE
Refills: 0 | Status: COMPLETED | OUTPATIENT
Start: 2019-12-08 | End: 2019-12-08

## 2019-12-08 RX ORDER — GABAPENTIN 400 MG/1
300 CAPSULE ORAL EVERY 12 HOURS
Refills: 0 | Status: DISCONTINUED | OUTPATIENT
Start: 2019-12-08 | End: 2019-12-09

## 2019-12-08 RX ORDER — IBUPROFEN 200 MG
600 TABLET ORAL EVERY 8 HOURS
Refills: 0 | Status: DISCONTINUED | OUTPATIENT
Start: 2019-12-08 | End: 2019-12-09

## 2019-12-08 RX ORDER — NALOXONE HYDROCHLORIDE 4 MG/.1ML
0.4 SPRAY NASAL ONCE
Refills: 0 | Status: COMPLETED | OUTPATIENT
Start: 2019-12-08 | End: 2019-12-07

## 2019-12-08 RX ORDER — NALOXONE HYDROCHLORIDE 4 MG/.1ML
0.4 SPRAY NASAL
Qty: 2 | Refills: 0 | Status: DISCONTINUED | OUTPATIENT
Start: 2019-12-08 | End: 2019-12-08

## 2019-12-08 RX ORDER — TRAZODONE HCL 50 MG
100 TABLET ORAL
Refills: 0 | Status: DISCONTINUED | OUTPATIENT
Start: 2019-12-08 | End: 2019-12-09

## 2019-12-08 RX ORDER — PANTOPRAZOLE SODIUM 20 MG/1
40 TABLET, DELAYED RELEASE ORAL
Refills: 0 | Status: DISCONTINUED | OUTPATIENT
Start: 2019-12-08 | End: 2019-12-09

## 2019-12-08 RX ORDER — CLONAZEPAM 1 MG
1 TABLET ORAL EVERY 12 HOURS
Refills: 0 | Status: DISCONTINUED | OUTPATIENT
Start: 2019-12-08 | End: 2019-12-09

## 2019-12-08 RX ORDER — ATORVASTATIN CALCIUM 80 MG/1
40 TABLET, FILM COATED ORAL AT BEDTIME
Refills: 0 | Status: DISCONTINUED | OUTPATIENT
Start: 2019-12-08 | End: 2019-12-09

## 2019-12-08 RX ORDER — ASPIRIN/CALCIUM CARB/MAGNESIUM 324 MG
81 TABLET ORAL DAILY
Refills: 0 | Status: DISCONTINUED | OUTPATIENT
Start: 2019-12-08 | End: 2019-12-09

## 2019-12-08 RX ORDER — POLYETHYLENE GLYCOL 3350 17 G/17G
17 POWDER, FOR SOLUTION ORAL
Refills: 0 | Status: DISCONTINUED | OUTPATIENT
Start: 2019-12-08 | End: 2019-12-09

## 2019-12-08 RX ORDER — ENOXAPARIN SODIUM 100 MG/ML
40 INJECTION SUBCUTANEOUS DAILY
Refills: 0 | Status: DISCONTINUED | OUTPATIENT
Start: 2019-12-08 | End: 2019-12-09

## 2019-12-08 RX ADMIN — NALOXONE HYDROCHLORIDE 0.4 MILLIGRAM(S): 4 SPRAY NASAL at 02:42

## 2019-12-08 RX ADMIN — Medication 100 MILLIGRAM(S): at 23:43

## 2019-12-08 RX ADMIN — GABAPENTIN 300 MILLIGRAM(S): 400 CAPSULE ORAL at 17:56

## 2019-12-08 RX ADMIN — POLYETHYLENE GLYCOL 3350 17 GRAM(S): 17 POWDER, FOR SOLUTION ORAL at 23:43

## 2019-12-08 RX ADMIN — Medication 1 MILLIGRAM(S): at 17:50

## 2019-12-08 RX ADMIN — NALOXONE HYDROCHLORIDE 100 MG/HR: 4 SPRAY NASAL at 02:43

## 2019-12-08 RX ADMIN — Medication 81 MILLIGRAM(S): at 14:56

## 2019-12-08 RX ADMIN — Medication 600 MILLIGRAM(S): at 14:56

## 2019-12-08 RX ADMIN — NALOXONE HYDROCHLORIDE 0.4 MILLIGRAM(S): 4 SPRAY NASAL at 01:15

## 2019-12-08 NOTE — ED PROVIDER NOTE - PHYSICAL EXAMINATION
CONSTITUTIONAL: Well-developed; well-nourished.   SKIN: warm, dry.   HEAD: Normocephalic; atraumatic.  EYES: pinpoint pupils bilaterally.   ENT: No nasal discharge; airway clear.  NECK: Supple; non tender.  CARD: S1, S2 normal; no murmurs, gallops, or rubs. Regular rate and rhythm.   RESP: RR 8; fine crackles bilaterally prior to narcan.   ABD: soft ntnd  EXT: Normal ROM.  No clubbing, cyanosis or edema.   NEURO: Minimally responsive to verbal stimuli.   PSYCH: Cooperative, appropriate.

## 2019-12-08 NOTE — ED ADULT NURSE NOTE - OBJECTIVE STATEMENT
Pt was brought by her boyfriend after he found her in the care altered mental status and with low respirations. Pt was given Narcan in the ER and patient responded to it, pt woke up and stated she took 3 tablets of Oxycodone 30 mg ER each and chewed them all at ones because she was in pain in her lower back. Pt stated that Oxycodone belong to her boyfriend and she found tablets in his pocket. Pt was given the total of 3 doses of Narcan 0.4 mg IV in the period of few hours because she was getting altered and bradypneic and she was placed on the Narcan drip. Pt is not in any distress otherwise, vitals are WDL, NSR, will monitor.

## 2019-12-08 NOTE — ED PROVIDER NOTE - CLINICAL SUMMARY MEDICAL DECISION MAKING FREE TEXT BOX
62yo F history of CVA with residual L sided weakness and R facial droop BIBEMS with aphasia last normal 1130 this evening. Per son who witnessed this, no other new deficits. No recent illness. No f/c/n/v/d, chest pain, shortness of breath, dysuria/hematuria, back pain. labs ekg imaging reviewed. pt repeatedly sonorous despite repeat narcan dosing. will admit 60yo F history of CVA with residual L sided weakness and R facial droop BIBEMS with aphasia last normal 1130 this evening. Per son who witnessed this, no other new deficits. No recent illness. No f/c/n/v/d, chest pain, shortness of breath, dysuria/hematuria, back pain. labs ekg imaging reviewed. pt repeatedly sonorous despite repeat narcan dosing. dw Dr. Moore, approved for ICU under Dr. Nair. will admit

## 2019-12-08 NOTE — SBIRT NOTE ADULT - NSSBIRTBRIEFINTDET_GEN_A_CORE
LMSW reviewed results of assessment with pt, provided support and psychoeducation regarding impact of usage on health and functioning. Pt indicates that she may require more intensive treatment, and may be going back to AA meetings. Pt was provided with referrals for providers.

## 2019-12-08 NOTE — SBIRT NOTE ADULT - NSSBIRTDRGPASSREFTXDET_GEN_A_CORE
LMSW reviewed results of assessment with pt, provided support and psychoeducation regarding impact of usage on health and functioning. Pt indicates that she may require more intensive treatment, aside from current provider. Pt was provided with referrals for providers.

## 2019-12-08 NOTE — PATIENT PROFILE ADULT - STATED REASON FOR ADMISSION
Slurred Speech "I was convulsing at prayer group and my boyfriend brought me here after I took his pills."

## 2019-12-08 NOTE — H&P ADULT - HISTORY OF PRESENT ILLNESS
59 years old female pt with past medical hx of hep c never treated, osteonecrosis of bilateral knees and prediabetes came to ER by her boyfriend due to unresponsiveness and shortness of breath.    As per the boyfriend at bedside, last night when they were going for dinner, he noticed that the pt was falling asleep every few minutes and it was difficult to keep her awake. It happened at around 7:30 PM, but at around 8:00 pm, he states he could not even wake up her up and she would gasp for air every few minutes. On questioning, pt stated that she took 3 pills of MS contin 30 mg each from his boyfriend's medicine bottle. He then brought her here to the ED. On arrival Pt was bradypneic with RR of 6-8, was given Narcan x 2 times but would fall asleep again so she was then started on Narcan drip.  On my encounter, pt had slightly slurred speech, states she feels sad most of the times, thinks of ending her life but has no active ideation or plan. Initial vbgs showed CO2 73, which then improved to 67. Pt refused to get intubated in case it is required. will get the MOLSDT form signed which she initially refused to sign.

## 2019-12-08 NOTE — ED PROVIDER NOTE - OBJECTIVE STATEMENT
Patient is a 60 yo F w/ hx of bipolar disorder, chronic back pain p/w unresponsiveness. Patient went to AA meeting with her boyfriend; BF found her unresponsive in car after leaving her alone for 20 min. Patient states she took 3, 30mg ER oxycodone pills that belong to her boyfriend. Denies any other drug use aside from her home meds. Patient states she took the pills for pain; denies SI/HI, denies hallucinations.

## 2019-12-08 NOTE — ED PROVIDER NOTE - PROGRESS NOTE DETAILS
pt responded initially to 0.4mg Narcan IV, says she taked klonopin 1.5mg BID, Gabapentin 1200mg once daily, Trazodone 100mg two pills BID, but today she found 3 of her boyfriends 30mg oxycodone and took them pt sonorous and bradypneic again. another 0.4mg Narcan given with improvement pt sonorous and bradypneic again, will give another narcan bolus, start gtt, admit pt responded initially to 0.4mg Narcan IV, says she taked klonopin 1.5mg BID, Gabapentin 1200mg once daily, Trazodone 100mg two pills BID, but today she found 3 of her boyfriends 30mg oxycodone and took them. No suicidal ideation, no homicidal ideation, no auditory or visual hallucinations 3 doses of narcan given and started on narcan drip and admitted to ICU.   Patient consents to toxicology consult.

## 2019-12-08 NOTE — H&P ADULT - ASSESSMENT
59 years old female pt with past medical hx of hep c never treated, osteonecrosis of bilateral knees and prediabetes came to ER by her boyfriend due to unresponsiveness and shortness of breath. Pt is being admitted for opioid overdose. s/p 0.4 mg narcan x 3 times then started on narcan drip  Initial vbgs showed CO2 73, which then improved to 67. Pt refused to get intubated in case it is required. will get the MOLSDT form signed which she initially refused to sign.        Assessment and Plan:  Opioid overdose on narcan drip  h/o hep C not treated  h/o osteonecrosis bilateral knees  prediabetes      CNS: avoid CNS depressant, cw narcan drip, monitor abgs, low threshold for intubation    HEENT:  Oral care    PULMONARY:  HOB @ 45 degrees, aspiration precaution, cxr am		    CARDIOVASCULAR: check CE, echo    GI: GI prophylaxis, npo    RENAL:  F/u  lytes and culture.  Correct as needed,    INFECTIOUS DISEASE: fu pan cx,     HEMATOLOGICAL:  DVT prophylaxis. trend CBC,    ENDOCRINE:  Follow up FS q6. start insulin protocol if FS > 180, fu creatine kinase.    CODE STATUS: full code    DISPOSITION: MICU

## 2019-12-08 NOTE — ED PROVIDER NOTE - ATTENDING CONTRIBUTION TO CARE
60yo F history of history of PTSD bipolar d/o back pain presenting with overdose. Per boyfriend, pt was last seen approx 1hr before being found in her parked car dozing off. Hx otherwise limited 2/2 acuity.   Constitutional: sonorous  Eyes: pupils 1mm non reactive. Conjunctiva normal.   ENT: No nasal discharge. Moist mucus membranes.  Neck: Supple, non tender, full range of motion.  CV: RRR no murmurs, rubs, or gallops. +S1S2.   Pulm: Clear to auscultation bilaterally. bradypneic   Abd: soft NT ND +BS.   Ext: Warm and well perfused x4, moving all extremities, no edema.   Psy: Cooperative, appropriate.   Skin: Warm, dry, no rash  Neuro: sonorous 58yo F history of history of PTSD bipolar d/o back pain presenting with overdose. Per boyfriend, pt was last seen approx 1hr before being found in her parked car dozing off. Hx otherwise limited 2/2 acuity.   Constitutional: sonorous  Eyes: pupils 1mm non reactive. Conjunctiva normal.   ENT: No nasal discharge. Moist mucus membranes.  Neck: Supple, non tender, full range of motion.  CV: RRR no murmurs, rubs, or gallops. +S1S2.   Pulm: Clear to auscultation bilaterally. bradypneic   Abd: soft NT ND +BS.   Ext: Warm and well perfused x4, moving all extremities, no edema.   Skin: Warm, dry, no rash  Neuro: sonorous

## 2019-12-08 NOTE — ED PROVIDER NOTE - NS ED ROS FT
Constitutional: No fevers.   Eyes:  No visual changes, eye pain or discharge.  ENMT:  No sore throat or runny nose.  Cardiac:  No chest pain, SOB or edema.   Respiratory:  No cough or respiratory distress. No hemoptysis.  GI:  No nausea, vomiting, diarrhea or abdominal pain.  :  No dysuria, frequency or burning.  MS:  No myalgia, muscle weakness, joint pain or back pain.  Neuro:  +unresponsive.   Skin:  No skin rash.   Endocrine: No history of thyroid disease or diabetes.

## 2019-12-08 NOTE — ED ADULT NURSE NOTE - NSIMPLEMENTINTERV_GEN_ALL_ED
Implemented All Fall with Harm Risk Interventions:  [place] to call system. Call bell, personal items and telephone within reach. Instruct patient to call for assistance. Room bathroom lighting operational. Non-slip footwear when patient is off stretcher. Physically safe environment: no spills, clutter or unnecessary equipment. Stretcher in lowest position, wheels locked, appropriate side rails in place. Provide visual cue, wrist band, yellow gown, etc. Monitor gait and stability. Monitor for mental status changes and reorient to person, place, and time. Review medications for side effects contributing to fall risk. Reinforce activity limits and safety measures with patient and family. Provide visual clues: red socks.

## 2019-12-09 ENCOUNTER — TRANSCRIPTION ENCOUNTER (OUTPATIENT)
Age: 59
End: 2019-12-09

## 2019-12-09 VITALS — WEIGHT: 179.9 LBS

## 2019-12-09 DIAGNOSIS — F43.10 POST-TRAUMATIC STRESS DISORDER, UNSPECIFIED: ICD-10-CM

## 2019-12-09 DIAGNOSIS — F32.9 MAJOR DEPRESSIVE DISORDER, SINGLE EPISODE, UNSPECIFIED: ICD-10-CM

## 2019-12-09 LAB
ALBUMIN SERPL ELPH-MCNC: 3.6 G/DL — SIGNIFICANT CHANGE UP (ref 3.5–5.2)
ALP SERPL-CCNC: 277 U/L — HIGH (ref 30–115)
ALT FLD-CCNC: 66 U/L — HIGH (ref 0–41)
AMPHET UR-MCNC: NEGATIVE — SIGNIFICANT CHANGE UP
ANION GAP SERPL CALC-SCNC: 12 MMOL/L — SIGNIFICANT CHANGE UP (ref 7–14)
AST SERPL-CCNC: 39 U/L — SIGNIFICANT CHANGE UP (ref 0–41)
BARBITURATES UR SCN-MCNC: NEGATIVE — SIGNIFICANT CHANGE UP
BASOPHILS # BLD AUTO: 0.01 K/UL — SIGNIFICANT CHANGE UP (ref 0–0.2)
BASOPHILS NFR BLD AUTO: 0.5 % — SIGNIFICANT CHANGE UP (ref 0–1)
BENZODIAZ UR-MCNC: POSITIVE
BILIRUB SERPL-MCNC: 0.3 MG/DL — SIGNIFICANT CHANGE UP (ref 0.2–1.2)
BUN SERPL-MCNC: 17 MG/DL — SIGNIFICANT CHANGE UP (ref 10–20)
CALCIUM SERPL-MCNC: 8.7 MG/DL — SIGNIFICANT CHANGE UP (ref 8.5–10.1)
CHLORIDE SERPL-SCNC: 105 MMOL/L — SIGNIFICANT CHANGE UP (ref 98–110)
CK SERPL-CCNC: 38 U/L — SIGNIFICANT CHANGE UP (ref 0–225)
CO2 SERPL-SCNC: 26 MMOL/L — SIGNIFICANT CHANGE UP (ref 17–32)
COCAINE METAB.OTHER UR-MCNC: NEGATIVE — SIGNIFICANT CHANGE UP
CREAT SERPL-MCNC: 0.6 MG/DL — LOW (ref 0.7–1.5)
EOSINOPHIL # BLD AUTO: 0.05 K/UL — SIGNIFICANT CHANGE UP (ref 0–0.7)
EOSINOPHIL NFR BLD AUTO: 2.4 % — SIGNIFICANT CHANGE UP (ref 0–8)
GLUCOSE SERPL-MCNC: 110 MG/DL — HIGH (ref 70–99)
HCT VFR BLD CALC: 34.1 % — LOW (ref 37–47)
HGB BLD-MCNC: 10.4 G/DL — LOW (ref 12–16)
IMM GRANULOCYTES NFR BLD AUTO: 0 % — LOW (ref 0.1–0.3)
LYMPHOCYTES # BLD AUTO: 0.61 K/UL — LOW (ref 1.2–3.4)
LYMPHOCYTES # BLD AUTO: 29.6 % — SIGNIFICANT CHANGE UP (ref 20.5–51.1)
MAGNESIUM SERPL-MCNC: 1.9 MG/DL — SIGNIFICANT CHANGE UP (ref 1.8–2.4)
MCHC RBC-ENTMCNC: 27.4 PG — SIGNIFICANT CHANGE UP (ref 27–31)
MCHC RBC-ENTMCNC: 30.5 G/DL — LOW (ref 32–37)
MCV RBC AUTO: 89.7 FL — SIGNIFICANT CHANGE UP (ref 81–99)
METHADONE UR-MCNC: NEGATIVE — SIGNIFICANT CHANGE UP
MONOCYTES # BLD AUTO: 0.26 K/UL — SIGNIFICANT CHANGE UP (ref 0.1–0.6)
MONOCYTES NFR BLD AUTO: 12.6 % — HIGH (ref 1.7–9.3)
NEUTROPHILS # BLD AUTO: 1.13 K/UL — LOW (ref 1.4–6.5)
NEUTROPHILS NFR BLD AUTO: 54.9 % — SIGNIFICANT CHANGE UP (ref 42.2–75.2)
NRBC # BLD: 0 /100 WBCS — SIGNIFICANT CHANGE UP (ref 0–0)
OPIATES UR-MCNC: POSITIVE
PCP SPEC-MCNC: SIGNIFICANT CHANGE UP
PLATELET # BLD AUTO: 125 K/UL — LOW (ref 130–400)
POTASSIUM SERPL-MCNC: 4.4 MMOL/L — SIGNIFICANT CHANGE UP (ref 3.5–5)
POTASSIUM SERPL-SCNC: 4.4 MMOL/L — SIGNIFICANT CHANGE UP (ref 3.5–5)
PROPOXYPHENE QUALITATIVE URINE RESULT: NEGATIVE — SIGNIFICANT CHANGE UP
PROT SERPL-MCNC: 6.1 G/DL — SIGNIFICANT CHANGE UP (ref 6–8)
RBC # BLD: 3.8 M/UL — LOW (ref 4.2–5.4)
RBC # FLD: 13.8 % — SIGNIFICANT CHANGE UP (ref 11.5–14.5)
SODIUM SERPL-SCNC: 143 MMOL/L — SIGNIFICANT CHANGE UP (ref 135–146)
TROPONIN T SERPL-MCNC: <0.01 NG/ML — SIGNIFICANT CHANGE UP
TSH SERPL-MCNC: 6.24 UIU/ML — HIGH (ref 0.27–4.2)
VIT B12 SERPL-MCNC: 683 PG/ML — SIGNIFICANT CHANGE UP (ref 232–1245)
WBC # BLD: 2.06 K/UL — LOW (ref 4.8–10.8)
WBC # FLD AUTO: 2.06 K/UL — LOW (ref 4.8–10.8)

## 2019-12-09 PROCEDURE — 71045 X-RAY EXAM CHEST 1 VIEW: CPT | Mod: 26

## 2019-12-09 PROCEDURE — 99221 1ST HOSP IP/OBS SF/LOW 40: CPT

## 2019-12-09 PROCEDURE — 99239 HOSP IP/OBS DSCHRG MGMT >30: CPT

## 2019-12-09 RX ORDER — CHLORHEXIDINE GLUCONATE 213 G/1000ML
1 SOLUTION TOPICAL
Refills: 0 | Status: DISCONTINUED | OUTPATIENT
Start: 2019-12-09 | End: 2019-12-09

## 2019-12-09 RX ADMIN — GABAPENTIN 300 MILLIGRAM(S): 400 CAPSULE ORAL at 05:39

## 2019-12-09 RX ADMIN — PANTOPRAZOLE SODIUM 40 MILLIGRAM(S): 20 TABLET, DELAYED RELEASE ORAL at 05:38

## 2019-12-09 RX ADMIN — Medication 1 MILLIGRAM(S): at 05:40

## 2019-12-09 RX ADMIN — Medication 81 MILLIGRAM(S): at 11:19

## 2019-12-09 RX ADMIN — Medication 100 MILLIGRAM(S): at 05:39

## 2019-12-09 NOTE — BEHAVIORAL HEALTH ASSESSMENT NOTE - RISK ASSESSMENT
Low Acute Suicide Risk Patient has chronic elevated risk of self-harm due to homelessness, history of substance use, history of previous SA, previous IPP admission. These risks are mitigated by no suicidal ideation, intent or plan, future orientation, identify reasons for living, responsibility to family, strong social support (bf), plans for further treatment, medication compliance.

## 2019-12-09 NOTE — CONSULT NOTE ADULT - ASSESSMENT
1-  Opioid toxicity resulting in unresponsiveness and respiratory depression requiring bolus dosing more than once and then an infusion to maintain adequate ventilation.    -  Initial blood gas confirmed respiratory acidosis  -  Mild transaminitis-  Will trend as I do not suspect acetaminophen toxicity  -  EKG normal  -  Prolonged and recurrent toxicity likely secondary to long acting oxycodone    PLAN:    - Naloxone infusion at 2/3 bolus dose per hour which reversde respiratory depression  - Can discontinue infusion after 6 hours and then monitor for at least 2 hours post discontinuation of infusion to ensure no recurrence of respiratory depression prior to transfer to floor.  -  Patient has opioid use disorder and would be a good candidate for buprenorphine if willing.  Needs discussion with patient.  If agreeable, can wait 12 hours or for COWS score to become >8.  Then start Suboxone 4 mg SL.  Watch for 1-2 hours and reassess if repeat dosing required.  -  This patient and family member/boyfriend need discharge with intranasal naloxone prior to discharge  -  Consider psych eval.    --Please call with any further questions    Stephani    [phone] (pager)

## 2019-12-09 NOTE — BEHAVIORAL HEALTH ASSESSMENT NOTE - OTHER PAST PSYCHIATRIC HISTORY (INCLUDE DETAILS REGARDING ONSET, COURSE OF ILLNESS, INPATIENT/OUTPATIENT TREATMENT)
PRINCIPAL DISCHARGE DIAGNOSIS  Diagnosis: Acute pancreatitis, unspecified complication status, unspecified pancreatitis type  Assessment and Plan of Treatment: PRINCIPAL DISCHARGE DIAGNOSIS  Diagnosis: Acute pancreatitis, unspecified complication status, unspecified pancreatitis type  Assessment and Plan of Treatment:       SECONDARY DISCHARGE DIAGNOSES  Diagnosis: Diastolic CHF, acute  Assessment and Plan of Treatment:     Diagnosis: NAVIN (acute kidney injury)  Assessment and Plan of Treatment: 1 previous IPP admission [facility] 2015: per patient "took too many pills" does not endorse intentional OD,   outpatient psychiatric care at [facility]: Dr. Colvin,   on Wellbutrin, gabapentin, Trazadone, Klonopin, self-reports pas psychiatric h/o MDD, PTSD, bipolar

## 2019-12-09 NOTE — DISCHARGE NOTE PROVIDER - CARE PROVIDER_API CALL
Amarjit Howard)  Internal Medicine  [address] [place] [address]  Phone: [phone]  Fax: [phone]  Follow Up Time: 1 week

## 2019-12-09 NOTE — DISCHARGE NOTE PROVIDER - NSDCFUADDINST_GEN_ALL_CORE_FT
please follow up with your psychiatrist and detox program as outpatient please follow up with your psychiatrist and detox program as outpatient    please call the hospital [phone] to schedule a hematology/oncology outpatient appointment for your low blood levels

## 2019-12-09 NOTE — DISCHARGE NOTE PROVIDER - NSDCCPCAREPLAN_GEN_ALL_CORE_FT
PRINCIPAL DISCHARGE DIAGNOSIS  Diagnosis: Opioid overdose  Assessment and Plan of Treatment: please follow up with your psychiatrist and detox program for opoid addiction treatment      SECONDARY DISCHARGE DIAGNOSES  Diagnosis: Hepatitis C  Assessment and Plan of Treatment: please follow up with your primary care doctor for treatment of hep C

## 2019-12-09 NOTE — BEHAVIORAL HEALTH ASSESSMENT NOTE - SUMMARY
59F undomiciled, unemployed, 1 adult child, with past psychiatric h/o MDD, ptsd, bipolar disorder, polysubstance use disorder, multiple detox/rehab admission, 1 previous IPP admission (2015 for "taking too many pills"), 1 previous SA (age 16 intention OD), established psychaitric care at [facility], in NA/AA, with pmh untreated hep C, osteonecrosis b/l, chronic pain, admitted to medicine for opioid overdose. Psychiatry consulted for suicidality.     Upon assessment, patient denies suicidal ideation, intent or plan. Her overdose is most likely secondary to medication misuse in an attempt to relieve her chronic pain rather than in an attempt to end her life. Patient is not suicidal, homicidal, psychotic, or manic. Patient does not warrant nor will she benefit from IPP admission. She however will benefit from follow up with her established psychiatric care and pain management.     Recommendations:   -continue current psychotropic regimen  -f/u with pain management   -f/u with outpatient psychiatrist Dr. Colvin: patient reports follow up appointment for next week 59F undomiciled, unemployed, 1 adult child, with past psychiatric h/o MDD, ptsd, bipolar disorder, polysubstance use disorder, multiple detox/rehab admission, 1 previous IPP admission (2015 for "taking too many pills"), 1 previous SA (age 16 intention OD), established psychaitric care at [facility], in NA/AA, with pmh untreated hep C, osteonecrosis b/l, chronic pain, admitted to medicine for opioid overdose. Psychiatry consulted for suicidality.     Upon assessment, patient denies suicidal ideation, intent or plan. Her overdose is most likely secondary to medication misuse in an attempt to relieve her chronic pain rather than in an attempt to end her life. Patient is not suicidal, homicidal, psychotic, or manic. Patient does not warrant nor will she benefit from IPP admission. She however will benefit from follow up with her established psychiatric care and pain management. There is also no indication for a referral to detox as patient has no signs or symptoms of opiate withdrawals.     Recommendations:   -continue current psychotropic regimen  -f/u with pain management   -f/u with outpatient psychiatrist Dr. Colvin: patient reports follow up appointment for next week

## 2019-12-09 NOTE — DISCHARGE NOTE PROVIDER - NSDCMRMEDTOKEN_GEN_ALL_CORE_FT
aspirin 81 mg oral tablet, chewable: 1 tab(s) orally once a day  atorvastatin 40 mg oral tablet: 1 tab(s) orally once a day (at bedtime)  clonazePAM 0.5 mg oral tablet: 1 tab(s) orally 2 times a day MDD:Do not exceed more than two tablets per day.  cyclobenzaprine 10 mg oral tablet: 1 tab(s) orally 3 times a day, As Needed  gabapentin 300 mg oral capsule: 1 cap(s) orally 2 times a day  traZODone 100 mg oral tablet: 1 tab(s) orally 2 times a day

## 2019-12-09 NOTE — DISCHARGE NOTE NURSING/CASE MANAGEMENT/SOCIAL WORK - PATIENT PORTAL LINK FT
You can access the FollowMyHealth Patient Portal offered by [facility] by registering at the following website: [email or web address]. By joining [facility]’s FollowMyHealth portal, you will also be able to view your health information using other applications (apps) compatible with our system.

## 2019-12-09 NOTE — DISCHARGE NOTE PROVIDER - HOSPITAL COURSE
59 years old female pt with past medical hx of hep c never treated, osteonecrosis of bilateral knees and prediabetes came to ER by her boyfriend due to unresponsiveness and shortness of breath.        As per the boyfriend at bedside, last night when they were going for dinner, he noticed that the pt was falling asleep every few minutes and it was difficult to keep her awake. It happened at around 7:30 PM, but at around 8:00 pm, he states he could not even wake up her up and she would gasp for air every few minutes. On questioning, pt stated that she took 3 pills of MS contin 30 mg each from his boyfriend's medicine bottle. He then brought her here to the ED. On arrival Pt was bradypneic with RR of 6-8, was given Narcan x 2 times but would fall asleep again so she was then started on Narcan drip.    On my encounter, pt had slightly slurred speech, states she feels sad most of the times, thinks of ending her life but has no active ideation or plan. admitted to ICU. Patient condition improved. she became off the narcan drip. tox team saw the patient and she needs follow up with them and with psychiatrist as outpatient. she will need narcan intranasal upon discharge 59 years old female pt with hx PMH of opiate abuse, bipolar disorder, anxiety presented with accidental opiate overdose. Patient was admitted to MICU on narcan gtt. She was downgraded once her encephalopathy resolved. She is stable for discharge. She denied any opiate withdrawal and therefore denied detox. She is enrolled in drug rehab and states she will follow up. Discharged on naloxone. 59 years old female pt with hx PMH of opiate abuse, bipolar disorder, anxiety presented with accidental opiate overdose. Patient was admitted to MICU on narcan gtt. She was downgraded once her encephalopathy resolved. She is stable for discharge. She denied any opiate withdrawal and therefore denied detox. She is enrolled in drug rehab and states she will follow up. Discharged on naloxone. ISTOP reference number: [id number].

## 2019-12-09 NOTE — BEHAVIORAL HEALTH ASSESSMENT NOTE - NSBHCONSULTFOLLOWAFTERCARE_PSY_A_CORE FT
patient to f/u next week at [facility], outpatient psychiatrist Dr. Colvin

## 2019-12-09 NOTE — BEHAVIORAL HEALTH ASSESSMENT NOTE - NS ED BHA SUICIDALITY PRESENT CURRENT INTENT DETAILS COLLATERAL FT
per collateral, patient has not endorsed suicidal ideations, intent or plan, nor is he aware of any recent suicidal behaviors or gestures

## 2019-12-09 NOTE — CONSULT NOTE ADULT - SUBJECTIVE AND OBJECTIVE BOX
Time:19 @ 11:05  [facility]-N [id number] 015 A  Emergent/Routine    Chief Complaint:  Unresponsiveness    History of Present Illness:  59y woman w/ hx of bipolar disorder, chronic back pain p/w unresponsiveness. Patient went to AA meeting with her boyfriend; BF found her unresponsive in car after leaving her alone for 20 min. Patient states she took 3, 30mg ER oxycodone pills that belong to her boyfriend. Denies any other drug use aside from her home meds. Patient states she took the pills for pain; denies SI/HI, denies hallucinations.  Patient required 3 doses of IV naloxone 0.4 mg for bradypnea and unresponsiveness and was started on a naloxone infusion and admitted to the ICU.        Past Medical History:  OPOID OVERDOSE  ^OPOID OVERDOSE  Handoff  MEWS Score  Prediabetes  Cervical cancer  History of osteonecrosis  Chronic hepatitis C without hepatic coma  Opioid overdose  Status post gastric banding  S/P tonsillectomy  S/P hysterectomy  OVERDOSE  20        Home Medications:  cyclobenzaprine 10 mg oral tablet: 1 tab(s) orally 3 times a day, As Needed (19 May 2019 12:41)  gabapentin 300 mg oral capsule: 1 cap(s) orally 2 times a day (19 May 2019 12:41)  traZODone 100 mg oral tablet: 1 tab(s) orally 2 times a day (17 May 2019 22:18)      Active Medications:  MEDICATIONS  (STANDING):  aspirin  chewable 81 milliGRAM(s) Oral daily  atorvastatin 40 milliGRAM(s) Oral at bedtime  chlorhexidine 4% Liquid 1 Application(s) Topical <User Schedule>  clonazePAM  Tablet 1 milliGRAM(s) Oral every 12 hours  enoxaparin Injectable 40 milliGRAM(s) SubCutaneous daily  gabapentin 300 milliGRAM(s) Oral every 12 hours  pantoprazole    Tablet 40 milliGRAM(s) Oral before breakfast  polyethylene glycol 3350 17 Gram(s) Oral two times a day  traZODone 100 milliGRAM(s) Oral two times a day    MEDICATIONS  (PRN):  ibuprofen  Tablet. 600 milliGRAM(s) Oral every 8 hours PRN Moderate Pain (4 - 6)        Social History:  Tobacco:   Denied  EtOH:   Denied  Illicit Substances:   Opioid and benzodiazepine use disorder    Family History:  N/A    Physical Exam:  Vital Signs Last 24 Hrs  T(C): 35.8 (09 Dec 2019 04:44), Max: 36.4 (08 Dec 2019 16:04)  T(F): 96.5 (09 Dec 2019 04:44), Max: 97.5 (08 Dec 2019 16:04)  HR: 95 (09 Dec 2019 04:44) (92 - 95)  BP: 130/68 (09 Dec 2019 04:44) (102/69 - 130/68)  BP(mean): --  RR: 18 (09 Dec 2019 04:44) (18 - 18)  SpO2: 96% (08 Dec 2019 22:27) (95% - 96%)  CAPILLARY BLOOD GLUCOSE      POCT Blood Glucose.: 92 mg/dL (08 Dec 2019 16:43)  POCT Blood Glucose.: 66 mg/dL (08 Dec 2019 11:49)        EKG:  NSR@80 bpm;  QRS-83 ms;  QTc-423 ms  Labs:                        10.4   2.06  )-----------( 125      ( 09 Dec 2019 06:07 )             34.1         143  |  105  |  17  ----------------------------<  110<H>  4.4   |  26  |  0.6<L>    Ca    8.7      09 Dec 2019 06:07  Phos  3.7     12  Mg     1.9         TPro  6.1  /  Alb  3.6  /  TBili  0.3  /  DBili  x   /  AST  39  /  ALT  66<H>  /  AlkPhos  277<H>  12    PT/INR - ( 08 Dec 2019 06:27 )   PT: 11.50 sec;   INR: 1.00 ratio         PTT - ( 08 Dec 2019 06:27 )  PTT:27.5 sec  Urinalysis Basic - ( 07 Dec 2019 23:35 )    Color: Yellow / Appearance: Clear / S.015 / pH: x  Gluc: x / Ketone: Negative  / Bili: Negative / Urobili: <2 mg/dL   Blood: x / Protein: Negative / Nitrite: Positive   Leuk Esterase: Moderate / RBC: 2 /HPF / WBC 15 /HPF   Sq Epi: x / Non Sq Epi: 3 /HPF / Bacteria: Many      ABG - ( 08 Dec 2019 05:20 )  pH, Arterial: 7.34  pH, Blood: x     /  pCO2: 49    /  pO2: 81    / HCO3: 27    / Base Excess: 0.3   /  SaO2: 95                CARDIAC MARKERS ( 09 Dec 2019 06:07 )  x     / <0.01 ng/mL / 38 U/L / x     / x      CARDIAC MARKERS ( 08 Dec 2019 11:16 )  x     / <0.01 ng/mL / 52 U/L / x     / 2.0 ng/mL      Aspirin:  Not detected  Acetaminophen:  Not detected  Ethanol:  Not detected

## 2019-12-09 NOTE — BEHAVIORAL HEALTH ASSESSMENT NOTE - DESCRIPTION (FIRST USE, LAST USE, QUANTITY, FREQUENCY, DURATION)
4 cigarettes per day previous h/o alcohol use d/lo, sober for 2 years, previously drinking 1 L hard liquor daily medicinal marijuana use h/o opioid use disorder , 1 bundle/daily for 1 year in 2015, 8988-1064 using 8mg dilaudid dx/day, sober for 2 years previous abuse

## 2019-12-09 NOTE — BEHAVIORAL HEALTH ASSESSMENT NOTE - SUICIDE PROTECTIVE FACTORS
Supportive social network of family or friends/Responsibility to family and others/Cultural, spiritual and/or moral attitudes against suicide/Identifies reasons for living/Has future plans

## 2019-12-09 NOTE — BEHAVIORAL HEALTH ASSESSMENT NOTE - CASE SUMMARY
Ms Funes is a 59 year old woman with a history of Mood disorder and Polysubstance dependence who was admitted to the ICU for overdose on her boyfriend's  prescription pain medication  but has since been down graded to the medical floor.   Psychiatry consult was called for a mental health evaluation and to determine if patient needs to go to the detox floor.   It does not appear that patient made a suicide attempt but rather overdosed on her boyfriend's pain medication to self treat her pain. With regards to the concern for opiate withdrawals ,  the ingestion of the pain medication was a one time occurrence and patient has no objective symptoms of opiate withdrawals. Patient She has no acute symptoms of psychosis , danika or depression nor does she have suicidal ideations , intent or plan. At this time, patient is not considered a danger to herself or others and does not need inpatient psychiatric hospitalization. There is no indication for detox because the ingestion of the narcotic pain medication was a one time, incident and patient has no objective signs or symptoms of opiate withdrawal. For now we recommend pain management consult to address patient's pain. Otherwise, patient can follow up with her outpatient psychiatrist whom she follows up with in a Substance Use disorder outpatient program on her scheduled appointment date. Patient informed of the plan and was agreeable.

## 2019-12-09 NOTE — BEHAVIORAL HEALTH ASSESSMENT NOTE - HPI (INCLUDE ILLNESS QUALITY, SEVERITY, DURATION, TIMING, CONTEXT, MODIFYING FACTORS, ASSOCIATED SIGNS AND SYMPTOMS)
59F undomiciled, unemployed, 1 adult child, with past psychiatric h/o MDD, ptsd, bipolar disorder, polysubstance use disorder, multiple detox/rehab admission, 1 previous IPP admission (2015 for "taking too many pills"), 1 previous SA (age 16 intention OD), established psychaitric care at [facility], in /, with pmh untreated hep C, osteonecrosis b/l, chronic pain, admitted to medicine for opioid overdose. Psychiatry consulted for suicidality.     Upon approach, pt calm, cooperative, tearful at times. States she took 3 of her bf 30mg MS contin, not in an attempt to intionally overdose, but in an attempt to relieve her pain. Patient reports h/o chronic pain secondary to her "multiple herniated discs" and osteonecrosis of her knee. She reports she follows up with pain management Dr. Flores who treats her pain with flexeril and epidural injections. Patient reports she had no intention to hurt herself, however, does endorse she "rubbed off" the coating of the medication in order to also "get a high" from the opioids. Patient endorses depressed mood, however states she has been compliant with her psychiatric medications and has follow up appointment next week with Dr. Colvin. She also endorses psychosocial stressors including relationship issues with her daughter and housing instability.  She adamantly denies wanting to hurt herself, and identifies reasons for living, including her daughter and her boyfriend. Patient endorses long history of opioid and alcohol use disorder. She reports going to  and . She reports she had been sober for 4 months and that this was an impulsive lapse in judgement. Patient tearful and endorses remorse when speaking of overdose. She denies SI/HI/AVH.     Collateral obtain from boyfriend at bedside who reports no known history of suicidal ideation, intent or plan. He expresses concerns that patient may "relapse" again, especially given her housing instability. However, otherwise expresses no other safety concerns for suicidal/homicidal behavior if discharge. He confirms that patient has been medication compliant with psychotropic regimen and has been following up with her outpatient psychiatric provider.

## 2019-12-10 LAB
6-ACETYLMORPHINE, UR RESULT: NEGATIVE NG/ML — SIGNIFICANT CHANGE UP
6MAM UR CFM-MCNC: NEGATIVE NG/ML — SIGNIFICANT CHANGE UP
CODEINE UR CFM-MCNC: NEGATIVE NG/ML — SIGNIFICANT CHANGE UP
CODEINE, UR RESULT: NEGATIVE NG/ML — SIGNIFICANT CHANGE UP
HYDROCODONE UR QL CFM: NEGATIVE NG/ML — SIGNIFICANT CHANGE UP
HYDROCODONE, UR RESULT: NEGATIVE NG/ML — SIGNIFICANT CHANGE UP
HYDROMORPHONE UR QL CFM: NEGATIVE NG/ML — SIGNIFICANT CHANGE UP
HYDROMORPHONE, UR RESULT: NEGATIVE NG/ML — SIGNIFICANT CHANGE UP
MORPHINE UR QL CFM: SIGNIFICANT CHANGE UP NG/ML
MORPHINE, UR RESULT: SIGNIFICANT CHANGE UP NG/ML
NOROXYCODONE (OPIATES), UR RESULT: NEGATIVE NG/ML — SIGNIFICANT CHANGE UP
NOROXYCODONE UR CFM-MCNC: NEGATIVE NG/ML — SIGNIFICANT CHANGE UP
OPIATES IN-HOUSE INTERPRETATION: POSITIVE
OPIATES UR QL CFM: POSITIVE
OXYCODONE (OPIATES), UR RESULT: NEGATIVE NG/ML — SIGNIFICANT CHANGE UP
OXYCODONE UR-MCNC: NEGATIVE NG/ML — SIGNIFICANT CHANGE UP
OXYMORPHONE (OPIATES), UR RESULT: NEGATIVE NG/ML — SIGNIFICANT CHANGE UP
OXYMORPHONE UR CFM-MCNC: NEGATIVE NG/ML — SIGNIFICANT CHANGE UP

## 2019-12-11 DIAGNOSIS — B18.2 CHRONIC VIRAL HEPATITIS C: ICD-10-CM

## 2019-12-11 DIAGNOSIS — G92 TOXIC ENCEPHALOPATHY: ICD-10-CM

## 2019-12-11 DIAGNOSIS — R47.81 SLURRED SPEECH: ICD-10-CM

## 2019-12-11 DIAGNOSIS — F41.9 ANXIETY DISORDER, UNSPECIFIED: ICD-10-CM

## 2019-12-11 DIAGNOSIS — F31.9 BIPOLAR DISORDER, UNSPECIFIED: ICD-10-CM

## 2019-12-11 DIAGNOSIS — R73.03 PREDIABETES: ICD-10-CM

## 2019-12-11 DIAGNOSIS — E87.2 ACIDOSIS: ICD-10-CM

## 2019-12-11 DIAGNOSIS — I69.392 FACIAL WEAKNESS FOLLOWING CEREBRAL INFARCTION: ICD-10-CM

## 2019-12-11 DIAGNOSIS — F17.210 NICOTINE DEPENDENCE, CIGARETTES, UNCOMPLICATED: ICD-10-CM

## 2019-12-11 DIAGNOSIS — R74.0 NONSPECIFIC ELEVATION OF LEVELS OF TRANSAMINASE AND LACTIC ACID DEHYDROGENASE [LDH]: ICD-10-CM

## 2019-12-11 DIAGNOSIS — Z56.0 UNEMPLOYMENT, UNSPECIFIED: ICD-10-CM

## 2019-12-11 DIAGNOSIS — F43.10 POST-TRAUMATIC STRESS DISORDER, UNSPECIFIED: ICD-10-CM

## 2019-12-11 DIAGNOSIS — M87.88 OTHER OSTEONECROSIS, OTHER SITE: ICD-10-CM

## 2019-12-11 DIAGNOSIS — T40.2X1A POISONING BY OTHER OPIOIDS, ACCIDENTAL (UNINTENTIONAL), INITIAL ENCOUNTER: ICD-10-CM

## 2019-12-11 DIAGNOSIS — F11.19 OPIOID ABUSE WITH UNSPECIFIED OPIOID-INDUCED DISORDER: ICD-10-CM

## 2019-12-11 DIAGNOSIS — I69.354 HEMIPLEGIA AND HEMIPARESIS FOLLOWING CEREBRAL INFARCTION AFFECTING LEFT NON-DOMINANT SIDE: ICD-10-CM

## 2019-12-11 SDOH — ECONOMIC STABILITY - INCOME SECURITY: UNEMPLOYMENT, UNSPECIFIED: Z56.0

## 2020-01-13 PROBLEM — Z00.00 ENCOUNTER FOR PREVENTIVE HEALTH EXAMINATION: Status: ACTIVE | Noted: 2020-01-13

## 2020-01-16 ENCOUNTER — APPOINTMENT (OUTPATIENT)
Dept: INTERNAL MEDICINE | Facility: CLINIC | Age: 60
End: 2020-01-16

## 2020-02-02 ENCOUNTER — EMERGENCY (EMERGENCY)
Facility: HOSPITAL | Age: 60
LOS: 0 days | Discharge: HOME | End: 2020-02-02
Admitting: EMERGENCY MEDICINE
Payer: MEDICAID

## 2020-02-02 VITALS
TEMPERATURE: 98 F | OXYGEN SATURATION: 99 % | DIASTOLIC BLOOD PRESSURE: 81 MMHG | HEART RATE: 108 BPM | RESPIRATION RATE: 18 BRPM | SYSTOLIC BLOOD PRESSURE: 161 MMHG

## 2020-02-02 DIAGNOSIS — Z90.89 ACQUIRED ABSENCE OF OTHER ORGANS: Chronic | ICD-10-CM

## 2020-02-02 DIAGNOSIS — Z90.710 ACQUIRED ABSENCE OF BOTH CERVIX AND UTERUS: Chronic | ICD-10-CM

## 2020-02-02 DIAGNOSIS — Z98.84 BARIATRIC SURGERY STATUS: Chronic | ICD-10-CM

## 2020-02-02 PROCEDURE — 99284 EMERGENCY DEPT VISIT MOD MDM: CPT | Mod: 57

## 2020-02-02 PROCEDURE — 73630 X-RAY EXAM OF FOOT: CPT | Mod: 26,RT

## 2020-02-02 PROCEDURE — 28490 TREAT BIG TOE FRACTURE: CPT | Mod: 54

## 2020-02-02 RX ORDER — OXYCODONE AND ACETAMINOPHEN 5; 325 MG/1; MG/1
1 TABLET ORAL ONCE
Refills: 0 | Status: DISCONTINUED | OUTPATIENT
Start: 2020-02-02 | End: 2020-02-02

## 2020-02-02 RX ORDER — IBUPROFEN 200 MG
600 TABLET ORAL ONCE
Refills: 0 | Status: COMPLETED | OUTPATIENT
Start: 2020-02-02 | End: 2020-02-02

## 2020-02-02 RX ORDER — TETANUS TOXOID, REDUCED DIPHTHERIA TOXOID AND ACELLULAR PERTUSSIS VACCINE, ADSORBED 5; 2.5; 8; 8; 2.5 [IU]/.5ML; [IU]/.5ML; UG/.5ML; UG/.5ML; UG/.5ML
0.5 SUSPENSION INTRAMUSCULAR ONCE
Refills: 0 | Status: COMPLETED | OUTPATIENT
Start: 2020-02-02 | End: 2020-02-02

## 2020-02-02 RX ADMIN — TETANUS TOXOID, REDUCED DIPHTHERIA TOXOID AND ACELLULAR PERTUSSIS VACCINE, ADSORBED 0.5 MILLILITER(S): 5; 2.5; 8; 8; 2.5 SUSPENSION INTRAMUSCULAR at 20:39

## 2020-02-02 RX ADMIN — OXYCODONE AND ACETAMINOPHEN 1 TABLET(S): 5; 325 TABLET ORAL at 21:50

## 2020-02-02 NOTE — ED PROVIDER NOTE - PATIENT PORTAL LINK FT
You can access the FollowMyHealth Patient Portal offered by [facility] by registering at the following website: [email or web address]. By joining [employer]’s FollowMyHealth portal, you will also be able to view your health information using other applications (apps) compatible with our system.

## 2020-02-02 NOTE — ED ADULT NURSE NOTE - OBJECTIVE STATEMENT
Pt presents c/o right big toe pain , unable to recall any incident , unsure if tripped and fall , + CMS , + pedal pulses , + capillary refill , noted right big toe swelling with bruising

## 2020-02-02 NOTE — ED PROVIDER NOTE - NSFOLLOWUPCLINICS_GEN_ALL_ED_FT
[facility]  Podiatry  .  [place]   Phone: [phone]  Fax:   Follow Up Time: 1-3 Days

## 2020-02-02 NOTE — ED PROVIDER NOTE - CARE PROVIDER_API CALL
Keo Royal (DPM)  [facility]  [address], [address]st [address]  [place] [address]  Phone: [phone]  Fax: [phone]  Follow Up Time: 1-3 Days

## 2020-02-02 NOTE — ED PROVIDER NOTE - PHYSICAL EXAMINATION
CONST: Well appearing in NAD  EYES:  Sclera and conjunctiva clear.  MS: mild ecchymosis to right great toe, mild TTP, superficial abrasion distal aspect of toe, no active bleeding, no FB, no TTP mid foot/ankle/knee/hip, Normal ROM in all extremities. No edema of lower extremities, no calf pain, pedal pulses 2+ bilaterally  SKIN: Warm, dry, no acute rashes. Good turgor  NEURO: R foot NVI

## 2020-02-02 NOTE — ED PROVIDER NOTE - NS ED ROS FT
CONST: No fever, chills or bodyaches  MS: + toe pain. no foot or ankle pain. No  back pain  SKIN: + ecchymosis.   NEURO: No headache, dizziness, paresthesias

## 2020-02-02 NOTE — ED PROVIDER NOTE - CLINICAL SUMMARY MEDICAL DECISION MAKING FREE TEXT BOX
small bony irregularity lateral aspect of great toe.  fracture shoe given.  f/u with pod.  I have discussed the discharge plan with the patient. The patient agrees with the plan, as discussed.  The patient understands Emergency Department diagnosis is a preliminary diagnosis often based on limited information and that the patient must adhere to the follow-up plan as discussed.  The patient understands that if the symptoms worsen or if prescribed medications do not have the desired/planned effect that the patient may return to the Emergency Department at any time for further evaluation and treatment.

## 2020-02-02 NOTE — ED PROVIDER NOTE - OBJECTIVE STATEMENT
60 y.o female w/ hx of opioid abuse, bipolar presents to the ED for evaluation of toe pain since this morning.  States she hit right great toe on oven, developed acute pain, sharp, worse w/ ambulation, mild severity, no radiation of pain. Denies paresthesias, fever, chills, ankle pain, knee pain.

## 2020-02-06 DIAGNOSIS — M79.676 PAIN IN UNSPECIFIED TOE(S): ICD-10-CM

## 2020-02-06 DIAGNOSIS — S92.401A DISPLACED UNSPECIFIED FRACTURE OF RIGHT GREAT TOE, INITIAL ENCOUNTER FOR CLOSED FRACTURE: ICD-10-CM
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
## 2020-02-06 DIAGNOSIS — Z23 ENCOUNTER FOR IMMUNIZATION: ICD-10-CM

## 2020-10-21 NOTE — H&P ADULT - ATTENDING COMMENTS
walks more than 3 miles daily, home chores Patient seen and examined independently. Agree with resident note/ history / physical exam and plan of care with following exceptions/additions/updates. Case discussed with house-staff, nursing and patient/pt decision maker.     #Progress Note Handoff  Pending (specify):  Consults Neuro follow up , Tests EEG, MRI   Family discussion: dw pt , she understands and agrees  Disposition: Home

## 2021-02-12 ENCOUNTER — EMERGENCY (EMERGENCY)
Facility: HOSPITAL | Age: 61
LOS: 0 days | Discharge: HOME | End: 2021-02-13
Attending: EMERGENCY MEDICINE | Admitting: EMERGENCY MEDICINE
Payer: MEDICAID

## 2021-02-12 VITALS
RESPIRATION RATE: 18 BRPM | OXYGEN SATURATION: 99 % | HEART RATE: 105 BPM | DIASTOLIC BLOOD PRESSURE: 77 MMHG | TEMPERATURE: 98 F | SYSTOLIC BLOOD PRESSURE: 137 MMHG

## 2021-02-12 VITALS
HEART RATE: 85 BPM | TEMPERATURE: 98 F | SYSTOLIC BLOOD PRESSURE: 130 MMHG | RESPIRATION RATE: 18 BRPM | OXYGEN SATURATION: 99 % | DIASTOLIC BLOOD PRESSURE: 70 MMHG
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
## 2021-02-12 DIAGNOSIS — Z90.89 ACQUIRED ABSENCE OF OTHER ORGANS: Chronic | ICD-10-CM
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
## 2021-02-12 DIAGNOSIS — M54.5 LOW BACK PAIN: ICD-10-CM

## 2021-02-12 DIAGNOSIS — M25.561 PAIN IN RIGHT KNEE: ICD-10-CM
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
## 2021-02-12 DIAGNOSIS — Z90.710 ACQUIRED ABSENCE OF BOTH CERVIX AND UTERUS: Chronic | ICD-10-CM
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
## 2021-02-12 DIAGNOSIS — Z98.84 BARIATRIC SURGERY STATUS: Chronic | ICD-10-CM

## 2021-02-12 DIAGNOSIS — M25.551 PAIN IN RIGHT HIP: ICD-10-CM

## 2021-02-12 PROCEDURE — 72110 X-RAY EXAM L-2 SPINE 4/>VWS: CPT | Mod: 26

## 2021-02-12 PROCEDURE — 73562 X-RAY EXAM OF KNEE 3: CPT | Mod: 26,RT

## 2021-02-12 PROCEDURE — 73501 X-RAY EXAM HIP UNI 1 VIEW: CPT | Mod: 26,RT

## 2021-02-12 PROCEDURE — 99284 EMERGENCY DEPT VISIT MOD MDM: CPT

## 2021-02-12 RX ORDER — KETOROLAC TROMETHAMINE 30 MG/ML
30 SYRINGE (ML) INJECTION ONCE
Refills: 0 | Status: DISCONTINUED | OUTPATIENT
Start: 2021-02-12 | End: 2021-02-12

## 2021-02-12 RX ADMIN — Medication 30 MILLIGRAM(S): at 21:50

## 2021-02-12 NOTE — ED PROVIDER NOTE - ATTENDING CONTRIBUTION TO CARE
Patient states that, has h/o arthritis, was told that, she needed new Rt hip and Rt knee; Patient with h/o generalized arthritis with multiple joint and back pain, was out doors cleaning the snow, slipped on the ice, at that time, twisted Rt knee and fell, landed on her buttocks. Patient is c/o Rt knee and Rt lateral hip pain, denies head/neck injury, denies new changes in chronic back pain, denies cp/sob/dizziness/weakness or any other symptoms prior to the fall. Patient states that, has h/o arthritis, was told that, she needed new Rt hip and Rt knee; Patient with h/o generalized arthritis with multiple joint and back pain, was out doors cleaning the snow, slipped on the ice, at that time, twisted Rt knee and fell, landed on her buttocks. Patient is c/o Rt knee and Rt lateral hip pain, denies head/neck injury, denies new changes in chronic back pain, denies cp/sob/dizziness/weakness or any other symptoms prior to the fall. Patient has been ambulatory since the fall.   Vitals reviewed.   Patient is awake, alert, o x 3, speaking in full sentences, appears comfortable and not in distress.   WAYNE/EOMI, no tenderness of the head/face noted.   Neck: No C-spine tenderness, no pain on ROM of the neck noted.   lungs: CTA, no crackles  abd: +BS, NT, ND, soft  Back: No midline vertebral column tenderness, no pain on ROM, no saddle anesthesia, distally NVI.   RUE/LUE have full ROM, no tenderness, no deformity, distally NVI.   RLE has no swelling, skin is intact, +tenderness of the Rt lateral hip, pain on ROM Of the hip, no swelling, no deformity, Rt knee has no swelling, skin is intact, mild generalized tenderness, stable knee joint, distally NVI. RLE is distally NVI.   CNS: awake, alert, o x 3, no focal neurologic deficits.   A/P: S/P accidental fall  x-rays, analgesia, reevaluation.

## 2021-02-12 NOTE — ED PROVIDER NOTE - PHYSICAL EXAMINATION
CONSTITUTIONAL: Well-developed; well-nourished; in no acute distress. gcs 15, speaking in full sentences, moving all extremities  SKIN: warm, dry  HEAD: Normocephalic; see above  EYES: PERRL, EOMI, no conjunctival erythema  ENT: No nasal discharge; airway clear, mucous membranes moist  CARD: +S1, S2 no murmurs, gallops, or rubs. Regular rate and rhythm. radial 2+ b/l, no chest wall tenderness or crepitus  RESP: No wheezes, rales or rhonchi. CTABL  ABD: soft ntnd, no rebound, no guarding, no rigidity  EXT: moves all extremities,  No clubbing, cyanosis or edema.   NEURO: midline lumbosacral spinal tenderness. Alert, oriented, grossly unremarkable, cn ii-xii grossly intact, follows commands  PSYCH: Cooperative, appropriate.

## 2021-02-12 NOTE — ED PROVIDER NOTE - NSFOLLOWUPCLINICS_GEN_ALL_ED_FT
[facility]  Medicine  [address], [place]   Phone: [phone]  Fax:   Follow Up Time: Urgent

## 2021-02-12 NOTE — ED PROVIDER NOTE - NS ED ROS FT
Constitutional:  No fevers or chills.  Eyes:  No visual changes, eye pain, or discharge.  ENT:  No hearing changes. No sore throat.  Neck:  No neck pain or stiffness.  Cardiac:  No CP or edema.  Resp:  No cough or SOB. No hemoptysis.   GI:  No nausea, vomiting, diarrhea, or abdominal pain.  :  No dysuria, frequency, or hematuria.  MSK:  (+) hip pain. (+) back pain. (+) knee pain.  Neuro:  No headache, dizziness, or weakness.  Skin:  No skin rash.

## 2021-02-12 NOTE — ED PROVIDER NOTE - OBJECTIVE STATEMENT
61y female with PMH of bipolar disorder and avascular necrosis of the right hip presents after mechanical trip and fall on ice from standing 30 min PTA.  reports pain to the lower back, right hip, and right knee.  is able to ambulate but states hip pain is aggregated with ambulation.  denies HT, LOC, fever, chills, N/V, CP, SOB, LE swelling, ABD pain, constipation, diarrhea, loss of motor or sensation in extremities, saddle anesthesia, trouble urinating or defecating.

## 2021-02-12 NOTE — ED PROVIDER NOTE - PROGRESS NOTE DETAILS
Julian- radiology report for hip xray was read for only one view.  discussed with radiologist multiple times about making addendum for 2 views.  resident states attending will finalize 2 view read tomorrow. Hip x-ray was read as one view, so I called radiology core several times and spoke with Dr. Dupont, was told they saw two views of the hip and showed no Fx, no dislocations. They will update the reading tomorrow. Discussed with patient and informed her about the findings and instructed to follow up with orthopedics this weeks for reevaluation and care.

## 2021-02-12 NOTE — ED PROVIDER NOTE - NSFOLLOWUPINSTRUCTIONS_ED_ALL_ED_FT
Fall Prevention in the Home, Adult  Falls can cause injuries and can affect people from all age groups. There are many simple things that you can do to make your home safe and to help prevent falls. Ask for help when making these changes, if needed.    What actions can I take to prevent falls?  General instructions     Use good lighting in all rooms. Replace any light bulbs that burn out.  Turn on lights if it is dark. Use night-lights.  Place frequently used items in easy-to-reach places. Lower the shelves around your home if necessary.  Set up furniture so that there are clear paths around it. Avoid moving your furniture around.  Remove throw rugs and other tripping hazards from the floor.  Avoid walking on wet floors.  Fix any uneven floor surfaces.  Add color or contrast paint or tape to grab bars and handrails in your home. Place contrasting color strips on the first and last steps of stairways.  When you use a stepladder, make sure that it is completely opened and that the sides are firmly locked. Have someone hold the ladder while you are using it. Do not climb a closed stepladder.  Be aware of any and all pets.  What can I do in the bathroom?     Keep the floor dry. Immediately clean up any water that spills onto the floor.  Remove soap buildup in the tub or shower on a regular basis.  Use non-skid mats or decals on the floor of the tub or shower.  Attach bath mats securely with double-sided, non-slip rug tape.  If you need to sit down while you are in the shower, use a plastic, non-slip stool.  Image ImageInstall grab bars by the toilet and in the tub and shower. Do not use towel bars as grab bars.  What can I do in the bedroom?     Make sure that a bedside light is easy to reach.  Do not use oversized bedding that drapes onto the floor.  Have a firm chair that has side arms to use for getting dressed.  What can I do in the kitchen?     Clean up any spills right away.  If you need to reach for something above you, use a sturdy step stool that has a grab bar.  Keep electrical cables out of the way.  Do not use floor polish or wax that makes floors slippery. If you must use wax, make sure that it is non-skid floor wax.  What can I do in the stairways?     Do not leave any items on the stairs.  Make sure that you have a light switch at the top of the stairs and the bottom of the stairs. Have them installed if you do not have them.  Make sure that there are handrails on both sides of the stairs. Fix handrails that are broken or loose. Make sure that handrails are as long as the stairways.  Install non-slip stair treads on all stairs in your home.  Avoid having throw rugs at the top or bottom of stairways, or secure the rugs with carpet tape to prevent them from moving.  Choose a carpet design that does not hide the edge of steps on the stairway.  Check any carpeting to make sure that it is firmly attached to the stairs. Fix any carpet that is loose or worn.  What can I do on the outside of my home?     Use bright outdoor lighting.  Regularly repair the edges of walkways and driveways and fix any cracks.  Remove high doorway thresholds.  Trim any shrubbery on the main path into your home.  Regularly check that handrails are securely fastened and in good repair. Both sides of any steps should have handrails.  Install guardrails along the edges of any raised decks or porches.  Clear walkways of debris and clutter, including tools and rocks.  Have leaves, snow, and ice cleared regularly.  Use sand or salt on walkways during winter months.  In the garage, clean up any spills right away, including grease or oil spills.  What other actions can I take?     Wear closed-toe shoes that fit well and support your feet. Wear shoes that have rubber soles or low heels.  Use mobility aids as needed, such as canes, walkers, scooters, and crutches.  Review your medicines with your health care provider. Some medicines can cause dizziness or changes in blood pressure, which increase your risk of falling.  Talk with your health care provider about other ways that you can decrease your risk of falls. This may include working with a physical therapist or  to improve your strength, balance, and endurance.    Where to find more information  Centers for Disease Control and Prevention, YAN: https://www.cdc.gov  [employer] [place] [employer] Aging: https://oa5rzhk.rodrigo.nih.gov  Contact a health care provider if:  You are afraid of falling at home.  You feel weak, drowsy, or dizzy at home.  You fall at home.  Summary  There are many simple things that you can do to make your home safe and to help prevent falls.  Ways to make your home safe include removing tripping hazards and installing grab bars in the bathroom.  Ask for help when making these changes in your home.  This information is not intended to replace advice given to you by your health care provider. Make sure you discuss any questions you have with your health care provider.

## 2021-02-12 NOTE — ED PROVIDER NOTE - CHIEF COMPLAINT
Chief Complaint: None 
  
History of Present Illness: 
  
Mr. Saloni De La Torre is a 78y.o. year old * male presented  to [facility] with upper mid abdominal & chest pain for the past 2-3 weeks without any nausea or vomiting. No fever or chills. He has history of CAD with stent placement in 2019. Hence seen by Cardiologist. Since CT scan of abdomen and pelvis showed distended gallbladder Surgical consultation requested to rule out cholecystitis. He denied ever had right upper abdominal pain. Currently on Heparin infusion. Ultrasound of abdomen negative for gallstones. Denies any abdominal pain. 
  
 
 
Review of Systems:  
Constitutional:  no fever, no chills,  no sweats, No weakness, No fatigue, No decreased activity. Respiratory: No shortness of breath, No cough, No sputum production, No hemoptysis, No wheezing, No cyanosis. Cardiovascular: No chest pain, No palpitations, No bradycardia, No tachycardia, No peripheral edema, No syncope. Gastrointestinal: No nausea, No vomiting, No diarrhea, No constipation, No heartburn, No abdominal pain. Genitourinary: No dysuria, No hematuria, No change in urine stream, No urethral discharge, No lesions. Hematology/Lymphatics: No bruising tendency, No bleeding tendency, No petechiae, No swollen lymph glands. Endocrine: No excessive thirst, No polyuria, No cold intolerance, No heat intolerance, No excessive hunger. Musculoskeletal: No back pain, No neck pain, No joint pain, No muscle pain, No claudication, No decreased range of motion, No trauma. Integumentary: No rash, No pruritus, No abrasions. Neurologic: Alert and oriented X4, No abnormal balance, No headache, No confusion, No numbness, No tingling. Psychiatric: No anxiety, No depression, No lu. Physical Exam:  
 
Vitals & Measurements: Wt Readings from Last 3 Encounters:  
12/11/20 52.6 kg (115 lb 15.4 oz) Temp Readings from Last 3 Encounters:  
12/12/20 98.8 °F (37.1 °C) BP Readings from Last 3 Encounters:  
12/12/20 114/73 Pulse Readings from Last 3 Encounters:  
12/12/20 91 Ht Readings from Last 3 Encounters:  
12/11/20 5' 10\" (1.778 m) Date 12/11/20 1900 - 12/12/20 1989 12/12/20 0700 - 12/13/20 9279 Shift 1238-1437 24 Hour Total 2528-0720 0883-3718 24 Hour Total  
INTAKE Blood    237.5 237.5 Volume (TRANSFUSE PACKED RBC'S)    237.5 237.5 Other  10 Irrigation Volume Input (mL) (Urinary Catheter 12/10/20 Vega)  10 Shift Total(mL/kg)  10(0.2)  237.5(4.5) 237.5(4.5) OUTPUT Urine(mL/kg/hr) 30 30 Urine Output (mL) (Urinary Catheter 12/10/20 Vega) 30 30 Dialysis  -100 NET Fluid Removed (mL)  -100 Shift Total(mL/kg) 30(0.6) -70(-1.3) NET -30 80  237.5 237.5 Weight (kg) 52.6 52.6 52.6 52.6 52.6 General: well appearing, no acute distress Head: Normal 
Face: Nornal 
HEENT: atraumatic, PERRLA, moist mucosa, normal pharynx, normal tonsils and adenoids, normal tongue, no fluid in sinuses Neck: Trachea midline, no carotid bruit, no masses Chest: Normal. 
Respiratory: normal chest wall expansion, CTA B, no r/r/w, no rubs Cardiovascular: RRR, no m/r/g, Normal S1 and S2 Abdomen: Soft, non tender, non-distended, normal bowel sounds in all quadrants, no hepatosplenomegaly, no tympany. Incision scar:  
Genitourinary: No inguinal hernia, normal external gentalia, Testis & scrotum normal, no renal angle tenderness Rectal: deferred Musculoskeletal: Normal ROM in upper and lower extremities, No joint swelling. Integumentary: Warm, dry, and pink, with no rash, purpura, or petechia Heme/Lymph: No lymphadenopathy, no bruises Neurological: Cranial Nerves II-XII grossly intact, No gross sensory or motor deficit. Psychiatric: Cooperative with normal mood, affect, and cognition Laboratory Values: XR CHEST PORT Final Result IMPRESSION: No evidence of pneumothorax or pleural fluid collection. US ABD COMP Final Result Impression: No ultrasound evidence for infectious/inflammatory process involving  
the gallbladder. XR CHEST PORT Final Result Impression:   
Successful placement of a nontunneled hemodialysis catheter. The catheter is  
ready for use. IR INSERT NON TUNL CVC OVER 5 YRS Final Result Impression:   
Successful placement of a nontunneled hemodialysis catheter. The catheter is  
ready for use. IR [facility] Final Result Impression:   
Successful placement of a nontunneled hemodialysis catheter. The catheter is  
ready for use. CT ABD PELV WO CONT Final Result IMPRESSION:  
1. No hydronephrosis. 2. Small nonobstructing left renal upper pole sinus calculus. No ureter  
calculus. 3. Markedly distended gallbladder without other findings potentially reflecting  
cholecystitis. 4. Atherosclerosis. 5. Peripheral ill-defined and nodular densities in the right lower lobe, could  
reflect acute inflammatory disease or infection among other possibilities. XR CHEST PORT Final Result US RETROPERITONEUM COMP    (Results Pending) US ABD LTD    (Results Pending) Assessment: 
Problem List Items Addressed This Visit Circulatory NSTEMI (non-ST elevated myocardial infarction) ([facility] [address] - Primary Relevant Medications  
 clopidogreL (PLAVIX) 75 mg tab  
 aspirin delayed-release 81 mg tablet  
 losartan (COZAAR) 25 mg tablet  
 isosorbide mononitrate ER (IMDUR) 30 mg tablet  
 nitroglycerin (Nitrostat) 0.4 mg SL tablet  
 rosuvastatin (CRESTOR) 40 mg tablet  
 metoprolol tartrate (LOPRESSOR) 25 mg tablet Other Visit Diagnoses Acute renal failure, unspecified acute renal failure type ([facility] [address] Relevant Medications  
 losartan (COZAAR) 25 mg tablet Plan: 1. Admission 2. Diet: per primary service 3. Medical & Cardiology & Nephrology management. 4.   No surgical issues. 5.   Will sign off.  
 
 
 Thank you for allowing me to participate in the care of this patient. The patient is a 61y Female complaining of fall.

## 2021-02-12 NOTE — ED PROVIDER NOTE - PATIENT PORTAL LINK FT
You can access the FollowMyHealth Patient Portal offered by [facility] by registering at the following website: [email or web address]. By joining [employer]’s FollowMyHealth portal, you will also be able to view your health information using other applications (apps) compatible with our system.

## 2021-02-12 NOTE — ED PROVIDER NOTE - WET READ LAUNCH FT
There are 2 Wet Read(s) to document. There are no Wet Read(s) to document. There is 1 Wet Read(s) to document.

## 2021-02-13 ENCOUNTER — EMERGENCY (EMERGENCY)
Facility: HOSPITAL | Age: 61
LOS: 0 days | Discharge: HOME | End: 2021-02-13
Attending: STUDENT IN AN ORGANIZED HEALTH CARE EDUCATION/TRAINING PROGRAM | Admitting: STUDENT IN AN ORGANIZED HEALTH CARE EDUCATION/TRAINING PROGRAM
Payer: MEDICAID

## 2021-02-13 VITALS
HEART RATE: 92 BPM | DIASTOLIC BLOOD PRESSURE: 87 MMHG | TEMPERATURE: 99 F | OXYGEN SATURATION: 99 % | RESPIRATION RATE: 18 BRPM | SYSTOLIC BLOOD PRESSURE: 163 MMHG

## 2021-02-13 VITALS — HEIGHT: 64 IN | WEIGHT: 179.9 LBS

## 2021-02-13 DIAGNOSIS — Z59.9 PROBLEM RELATED TO HOUSING AND ECONOMIC CIRCUMSTANCES, UNSPECIFIED: ICD-10-CM

## 2021-02-13 DIAGNOSIS — Z98.84 BARIATRIC SURGERY STATUS: ICD-10-CM

## 2021-02-13 DIAGNOSIS — Z90.49 ACQUIRED ABSENCE OF OTHER SPECIFIED PARTS OF DIGESTIVE TRACT: ICD-10-CM

## 2021-02-13 DIAGNOSIS — Z90.710 ACQUIRED ABSENCE OF BOTH CERVIX AND UTERUS: ICD-10-CM

## 2021-02-13 DIAGNOSIS — Z90.710 ACQUIRED ABSENCE OF BOTH CERVIX AND UTERUS: Chronic | ICD-10-CM

## 2021-02-13 DIAGNOSIS — Z98.84 BARIATRIC SURGERY STATUS: Chronic | ICD-10-CM

## 2021-02-13 DIAGNOSIS — Z90.89 ACQUIRED ABSENCE OF OTHER ORGANS: Chronic | ICD-10-CM

## 2021-02-13 PROCEDURE — 99282 EMERGENCY DEPT VISIT SF MDM: CPT

## 2021-02-13 SDOH — ECONOMIC STABILITY - INCOME SECURITY: PROBLEM RELATED TO HOUSING AND ECONOMIC CIRCUMSTANCES, UNSPECIFIED: Z59.9

## 2021-02-13 NOTE — ED PROVIDER NOTE - NSFOLLOWUPCLINICS_GEN_ALL_ED_FT
[facility]  Medicine  [address], [place]   Phone: [phone]  Fax:   Follow Up Time: 1-3 Days

## 2021-02-13 NOTE — ED ADULT NURSE NOTE - NSIMPLEMENTINTERV_GEN_ALL_ED
Implemented All Universal Safety Interventions:  [place] to call system. Call bell, personal items and telephone within reach. Instruct patient to call for assistance. Room bathroom lighting operational. Non-slip footwear when patient is off stretcher. Physically safe environment: no spills, clutter or unnecessary equipment. Stretcher in lowest position, wheels locked, appropriate side rails in place.

## 2021-02-13 NOTE — ED ADULT NURSE NOTE - CHIEF COMPLAINT QUOTE
sera roberto, seen at [facility], now seeking placement due to homelessness

## 2021-02-13 NOTE — ED PROVIDER NOTE - PATIENT PORTAL LINK FT
You can access the FollowMyHealth Patient Portal offered by [facility] by registering at the following website: [email or web address]. By joining [employer]’s FollowMyHealth portal, you will also be able to view your health information using other applications (apps) compatible with our system.
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
## 2021-07-28 ENCOUNTER — EMERGENCY (EMERGENCY)
Facility: HOSPITAL | Age: 61
LOS: 0 days | Discharge: HOME | End: 2021-07-29
Attending: EMERGENCY MEDICINE | Admitting: EMERGENCY MEDICINE
Payer: COMMERCIAL

## 2021-07-28 VITALS
RESPIRATION RATE: 19 BRPM | TEMPERATURE: 97 F | HEIGHT: 64 IN | DIASTOLIC BLOOD PRESSURE: 66 MMHG | OXYGEN SATURATION: 98 % | WEIGHT: 149.91 LBS | HEART RATE: 81 BPM | SYSTOLIC BLOOD PRESSURE: 103 MMHG

## 2021-07-28 DIAGNOSIS — S00.03XA CONTUSION OF SCALP, INITIAL ENCOUNTER: ICD-10-CM

## 2021-07-28 DIAGNOSIS — S01.81XA LACERATION WITHOUT FOREIGN BODY OF OTHER PART OF HEAD, INITIAL ENCOUNTER: ICD-10-CM

## 2021-07-28 DIAGNOSIS — Z85.41 PERSONAL HISTORY OF MALIGNANT NEOPLASM OF CERVIX UTERI: ICD-10-CM

## 2021-07-28 DIAGNOSIS — Z98.84 BARIATRIC SURGERY STATUS: Chronic | ICD-10-CM

## 2021-07-28 DIAGNOSIS — R73.03 PREDIABETES: ICD-10-CM

## 2021-07-28 DIAGNOSIS — S02.2XXA FRACTURE OF NASAL BONES, INITIAL ENCOUNTER FOR CLOSED FRACTURE: ICD-10-CM

## 2021-07-28 DIAGNOSIS — Z86.19 PERSONAL HISTORY OF OTHER INFECTIOUS AND PARASITIC DISEASES: ICD-10-CM

## 2021-07-28 DIAGNOSIS — Z90.710 ACQUIRED ABSENCE OF BOTH CERVIX AND UTERUS: Chronic | ICD-10-CM

## 2021-07-28 DIAGNOSIS — S40.021A CONTUSION OF RIGHT UPPER ARM, INITIAL ENCOUNTER: ICD-10-CM
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
## 2021-07-28 DIAGNOSIS — Z79.899 OTHER LONG TERM (CURRENT) DRUG THERAPY: ICD-10-CM

## 2021-07-28 DIAGNOSIS — S80.212A ABRASION, LEFT KNEE, INITIAL ENCOUNTER: ICD-10-CM

## 2021-07-28 DIAGNOSIS — Z90.710 ACQUIRED ABSENCE OF BOTH CERVIX AND UTERUS: ICD-10-CM

## 2021-07-28 DIAGNOSIS — K63.89 OTHER SPECIFIED DISEASES OF INTESTINE: ICD-10-CM

## 2021-07-28 DIAGNOSIS — S40.022A CONTUSION OF LEFT UPPER ARM, INITIAL ENCOUNTER: ICD-10-CM
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
## 2021-07-28 DIAGNOSIS — Z90.89 ACQUIRED ABSENCE OF OTHER ORGANS: Chronic | ICD-10-CM

## 2021-07-28 DIAGNOSIS — F17.200 NICOTINE DEPENDENCE, UNSPECIFIED, UNCOMPLICATED: ICD-10-CM

## 2021-07-28 DIAGNOSIS — R59.0 LOCALIZED ENLARGED LYMPH NODES: ICD-10-CM

## 2021-07-28 DIAGNOSIS — Z79.82 LONG TERM (CURRENT) USE OF ASPIRIN: ICD-10-CM

## 2021-07-28 LAB
ALBUMIN SERPL ELPH-MCNC: 4.2 G/DL — SIGNIFICANT CHANGE UP (ref 3.5–5.2)
ALP SERPL-CCNC: 94 U/L — SIGNIFICANT CHANGE UP (ref 30–115)
ALT FLD-CCNC: 16 U/L — SIGNIFICANT CHANGE UP (ref 0–41)
ANION GAP SERPL CALC-SCNC: 12 MMOL/L — SIGNIFICANT CHANGE UP (ref 7–14)
APTT BLD: 32.4 SEC — SIGNIFICANT CHANGE UP (ref 27–39.2)
AST SERPL-CCNC: 31 U/L — SIGNIFICANT CHANGE UP (ref 0–41)
BASOPHILS # BLD AUTO: 0.03 K/UL — SIGNIFICANT CHANGE UP (ref 0–0.2)
BASOPHILS NFR BLD AUTO: 0.5 % — SIGNIFICANT CHANGE UP (ref 0–1)
BILIRUB SERPL-MCNC: 0.2 MG/DL — SIGNIFICANT CHANGE UP (ref 0.2–1.2)
BUN SERPL-MCNC: 22 MG/DL — HIGH (ref 10–20)
CALCIUM SERPL-MCNC: 9.1 MG/DL — SIGNIFICANT CHANGE UP (ref 8.5–10.1)
CHLORIDE SERPL-SCNC: 106 MMOL/L — SIGNIFICANT CHANGE UP (ref 98–110)
CK SERPL-CCNC: 514 U/L — HIGH (ref 0–225)
CO2 SERPL-SCNC: 22 MMOL/L — SIGNIFICANT CHANGE UP (ref 17–32)
CREAT SERPL-MCNC: 1 MG/DL — SIGNIFICANT CHANGE UP (ref 0.7–1.5)
EOSINOPHIL # BLD AUTO: 0.06 K/UL — SIGNIFICANT CHANGE UP (ref 0–0.7)
EOSINOPHIL NFR BLD AUTO: 1 % — SIGNIFICANT CHANGE UP (ref 0–8)
GLUCOSE SERPL-MCNC: 75 MG/DL — SIGNIFICANT CHANGE UP (ref 70–99)
HCT VFR BLD CALC: 36.7 % — LOW (ref 37–47)
HGB BLD-MCNC: 11.3 G/DL — LOW (ref 12–16)
IMM GRANULOCYTES NFR BLD AUTO: 0.6 % — HIGH (ref 0.1–0.3)
INR BLD: 0.96 RATIO — SIGNIFICANT CHANGE UP (ref 0.65–1.3)
LACTATE SERPL-SCNC: 1.9 MMOL/L — SIGNIFICANT CHANGE UP (ref 0.7–2)
LIDOCAIN IGE QN: 17 U/L — SIGNIFICANT CHANGE UP (ref 7–60)
LYMPHOCYTES # BLD AUTO: 1.39 K/UL — SIGNIFICANT CHANGE UP (ref 1.2–3.4)
LYMPHOCYTES # BLD AUTO: 22.6 % — SIGNIFICANT CHANGE UP (ref 20.5–51.1)
MCHC RBC-ENTMCNC: 27.8 PG — SIGNIFICANT CHANGE UP (ref 27–31)
MCHC RBC-ENTMCNC: 30.8 G/DL — LOW (ref 32–37)
MCV RBC AUTO: 90.4 FL — SIGNIFICANT CHANGE UP (ref 81–99)
MONOCYTES # BLD AUTO: 0.59 K/UL — SIGNIFICANT CHANGE UP (ref 0.1–0.6)
MONOCYTES NFR BLD AUTO: 9.6 % — HIGH (ref 1.7–9.3)
NEUTROPHILS # BLD AUTO: 4.05 K/UL — SIGNIFICANT CHANGE UP (ref 1.4–6.5)
NEUTROPHILS NFR BLD AUTO: 65.7 % — SIGNIFICANT CHANGE UP (ref 42.2–75.2)
NRBC # BLD: 0 /100 WBCS — SIGNIFICANT CHANGE UP (ref 0–0)
PLATELET # BLD AUTO: 209 K/UL — SIGNIFICANT CHANGE UP (ref 130–400)
POTASSIUM SERPL-MCNC: 4 MMOL/L — SIGNIFICANT CHANGE UP (ref 3.5–5)
POTASSIUM SERPL-SCNC: 4 MMOL/L — SIGNIFICANT CHANGE UP (ref 3.5–5)
PROT SERPL-MCNC: 6.7 G/DL — SIGNIFICANT CHANGE UP (ref 6–8)
PROTHROM AB SERPL-ACNC: 11 SEC — SIGNIFICANT CHANGE UP (ref 9.95–12.87)
RBC # BLD: 4.06 M/UL — LOW (ref 4.2–5.4)
RBC # FLD: 13.6 % — SIGNIFICANT CHANGE UP (ref 11.5–14.5)
SODIUM SERPL-SCNC: 140 MMOL/L — SIGNIFICANT CHANGE UP (ref 135–146)
TROPONIN T SERPL-MCNC: <0.01 NG/ML — SIGNIFICANT CHANGE UP
WBC # BLD: 6.16 K/UL — SIGNIFICANT CHANGE UP (ref 4.8–10.8)
WBC # FLD AUTO: 6.16 K/UL — SIGNIFICANT CHANGE UP (ref 4.8–10.8)

## 2021-07-28 PROCEDURE — 12013 RPR F/E/E/N/L/M 2.6-5.0 CM: CPT

## 2021-07-28 PROCEDURE — 70450 CT HEAD/BRAIN W/O DYE: CPT | Mod: 26,MA

## 2021-07-28 PROCEDURE — 99285 EMERGENCY DEPT VISIT HI MDM: CPT | Mod: 25

## 2021-07-28 PROCEDURE — 71260 CT THORAX DX C+: CPT | Mod: 26,MA

## 2021-07-28 PROCEDURE — 99053 MED SERV 10PM-8AM 24 HR FAC: CPT

## 2021-07-28 PROCEDURE — 72125 CT NECK SPINE W/O DYE: CPT | Mod: 26,MA

## 2021-07-28 PROCEDURE — 74177 CT ABD & PELVIS W/CONTRAST: CPT | Mod: 26,MA
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
## 2021-07-29 VITALS
RESPIRATION RATE: 18 BRPM | DIASTOLIC BLOOD PRESSURE: 62 MMHG | HEART RATE: 76 BPM | OXYGEN SATURATION: 98 % | TEMPERATURE: 98 F | SYSTOLIC BLOOD PRESSURE: 115 MMHG

## 2021-07-29 LAB
APPEARANCE UR: CLEAR — SIGNIFICANT CHANGE UP
BILIRUB UR-MCNC: NEGATIVE — SIGNIFICANT CHANGE UP
COLOR SPEC: COLORLESS — SIGNIFICANT CHANGE UP
DIFF PNL FLD: NEGATIVE — SIGNIFICANT CHANGE UP
ETHANOL SERPL-MCNC: 120 MG/DL — HIGH
GLUCOSE UR QL: NEGATIVE — SIGNIFICANT CHANGE UP
KETONES UR-MCNC: NEGATIVE — SIGNIFICANT CHANGE UP
LEUKOCYTE ESTERASE UR-ACNC: NEGATIVE — SIGNIFICANT CHANGE UP
NITRITE UR-MCNC: NEGATIVE — SIGNIFICANT CHANGE UP
PH UR: 6.5 — SIGNIFICANT CHANGE UP (ref 5–8)
PROT UR-MCNC: NEGATIVE — SIGNIFICANT CHANGE UP
SP GR SPEC: 1.02 — SIGNIFICANT CHANGE UP (ref 1.01–1.03)
UROBILINOGEN FLD QL: SIGNIFICANT CHANGE UP

## 2021-07-29 PROCEDURE — 73060 X-RAY EXAM OF HUMERUS: CPT | Mod: 26

## 2021-07-29 PROCEDURE — 73070 X-RAY EXAM OF ELBOW: CPT | Mod: 26,50

## 2021-07-29 PROCEDURE — 70486 CT MAXILLOFACIAL W/O DYE: CPT | Mod: 26,MA

## 2021-07-29 PROCEDURE — 71045 X-RAY EXAM CHEST 1 VIEW: CPT | Mod: 26

## 2021-07-29 PROCEDURE — 73110 X-RAY EXAM OF WRIST: CPT | Mod: 26,50

## 2021-07-29 PROCEDURE — 73560 X-RAY EXAM OF KNEE 1 OR 2: CPT | Mod: 26,LT

## 2021-07-29 PROCEDURE — 93010 ELECTROCARDIOGRAM REPORT: CPT

## 2021-07-29 PROCEDURE — 72170 X-RAY EXAM OF PELVIS: CPT | Mod: 26

## 2021-07-29 PROCEDURE — 73090 X-RAY EXAM OF FOREARM: CPT | Mod: 26,50

## 2021-07-29 PROCEDURE — 73130 X-RAY EXAM OF HAND: CPT | Mod: 26,50

## 2021-07-29 RX ORDER — MORPHINE SULFATE 50 MG/1
4 CAPSULE, EXTENDED RELEASE ORAL ONCE
Refills: 0 | Status: DISCONTINUED | OUTPATIENT
Start: 2021-07-29 | End: 2021-07-29

## 2021-07-29 RX ORDER — MORPHINE SULFATE 50 MG/1
2 CAPSULE, EXTENDED RELEASE ORAL ONCE
Refills: 0 | Status: DISCONTINUED | OUTPATIENT
Start: 2021-07-29 | End: 2021-07-29

## 2021-07-29 RX ADMIN — MORPHINE SULFATE 4 MILLIGRAM(S): 50 CAPSULE, EXTENDED RELEASE ORAL at 03:08
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
## 2021-07-29 NOTE — ED PROVIDER NOTE - NS ED ROS FT
Constitutional:  No fevers or chills.  Eyes:  No visual changes, eye pain, or discharge.  ENT:  No hearing changes. No sore throat.  Neck:  No neck pain or stiffness.  Cardiac:  No CP or edema.  Resp:  No cough or SOB. No hemoptysis.   GI:  No nausea, vomiting, diarrhea, or abdominal pain.  :  No dysuria, frequency, or hematuria.  MSK:  No myalgias or joint pain/swelling.  Neuro: (+) headache, dizziness, or weakness.  Skin:  No skin rash. (+) laceration
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
## 2021-07-29 NOTE — ED PROVIDER NOTE - PATIENT PORTAL LINK FT
You can access the FollowMyHealth Patient Portal offered by [facility] by registering at the following website: [email or web address]. By joining [employer]’s FollowMyHealth portal, you will also be able to view your health information using other applications (apps) compatible with our system.
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
## 2021-07-29 NOTE — ED PROVIDER NOTE - NSFOLLOWUPINSTRUCTIONS_ED_ALL_ED_FT
Nasal Fracture    A nasal fracture is a break or crack in the bones or cartilage of the nose. Minor breaks do not require treatment. These breaks usually heal on their own after about one month. Serious breaks may require surgery.     CAUSES  This injury is usually caused by a blunt injury to the nose. This type of injury often occurs from:    Contact sports.  Car accidents.  Falls.  Getting punched.    SYMPTOMS  Symptoms of this injury include:    Pain.  Swelling of the nose.  Bleeding from the nose.  Bruising around the nose or eyes. This may include having black eyes.  Crooked appearance of the nose.    DIAGNOSIS  This injury may be diagnosed with a physical exam. The health care provider will gently feel the nose for signs of broken bones. He or she will look inside the nostrils to make sure that there is not a blood-filled swelling on the dividing wall between the nostrils (septal hematoma). X-rays of the nose may not show a nasal fracture even when one is present. In some cases, X-rays or a CT scan may be done 1–5 days after the injury. Sometimes, the health care provider will want to wait until the swelling has gone down.    TREATMENT  Often, minor fractures that have caused no deformity do not require treatment. More serious fractures in which bones have moved out of position may require surgery, which will take place after the swelling is gone. Surgery will stabilize and align the fracture. In some cases, a health care provider may be able to reposition the bones without surgery. This may be done in the health care provider's office after medicine is given to numb the area (local anesthetic).    HOME CARE INSTRUCTIONS  If directed, apply ice to the injured area:  Put ice in a plastic bag.  Place a towel between your skin and the bag.  Leave the ice on for 20 minutes, 2–3 times per day.  Take over-the-counter and prescription medicines only as told by your health care provider.  If your nose starts to bleed, sit in an upright position while you squeeze the soft parts of your nose against the dividing wall between your nostrils (septum) for 10 minutes.  Try to avoid blowing your nose.  Return to your normal activities as told by your health care provider. Ask your health care provider what activities are safe for you.  Avoid contact sports for 3–4 weeks or as told by your health care provider.  Keep all follow-up visits as told by your health care provider. This is important.    SEEK MEDICAL CARE IF:  Your pain increases or becomes severe.  You continue to have nosebleeds.  The shape of your nose does not return to normal within 5 days.  You have pus draining out of your nose.    SEEK IMMEDIATE MEDICAL CARE IF:  You have bleeding from your nose that does not stop after you pinch your nostrils closed for 20 minutes and keep ice on your nose.  You have clear fluid draining out of your nose.  You notice a grape-like swelling on the septum. This swelling is a collection of blood (hematoma) that must be drained to help prevent infection.  You have difficulty moving your eyes.  You have repeated vomiting.    ADDITIONAL NOTES AND INSTRUCTIONS    Please follow up with your Primary MD in 24-48 hr.  Seek immediate medical care for any new/worsening signs or symptoms.     please follow up with your oncologist given CT findings which we discussed.  a copy of the report is included in your discharge.

## 2021-07-29 NOTE — ED PROVIDER NOTE - CARE PROVIDERS DIRECT ADDRESSES
,[email or web address],DirectAddress_Unknown [email or web address],DirectAddress_Unknown,DirectAddress_Unknown

## 2021-07-29 NOTE — ED PROVIDER NOTE - NSFOLLOWUPCLINICS_GEN_ALL_ED_FT
[facility]  Trauma Surgery  [address] [place] [address]  Phone: [phone]  Fax:   Follow Up Time: Urgent     [facility]  Trauma Surgery  [address], [facility] [place] [address]  Phone: [phone]  Fax:   Follow Up Time: Urgent    [facility]  ENT  [address] [place] [address]  Phone: [phone]  Fax:

## 2021-07-29 NOTE — ED PROVIDER NOTE - CARE PROVIDER_API CALL
Mark Madison)  Internal Medicine; Medical Oncology  [address] [place] [address]  Phone: [phone]  Fax: [phone]  Follow Up Time: Urgent    Rafa Velarde (DDS)  OralMaxillofacial Surgery  [address] [place] [address]  Phone: [phone]  Fax: [phone]  Follow Up Time: 1-3 Days   Mark Madison)  Internal Medicine; Medical Oncology  [address] [place] [address]  Phone: [phone]  Fax: [phone]  Follow Up Time: Urgent    Rafa Velarde (DDS)  OralMaxillofacial Surgery  [address] [place] [address]  Phone: [phone]  Fax: [phone]  Follow Up Time: 1-3 Days    Almas Serrano)  Complex General Surgical Oncology; Surgery  [address] [place] [address], [address] [place] [address]  Phone: [phone]  Fax: [phone]  Follow Up Time: 7-10 Days

## 2021-07-29 NOTE — ED PROVIDER NOTE - PROVIDER TOKENS
PROVIDER:[TOKEN:[[id number]],FOLLOWUP:[Urgent]],PROVIDER:[TOKEN:[[id number]],FOLLOWUP:[1-3 Days]] PROVIDER:[TOKEN:[[id number]],FOLLOWUP:[Urgent]],PROVIDER:[TOKEN:[[id number]],FOLLOWUP:[1-3 Days]],PROVIDER:[TOKEN:[[id number]],FOLLOWUP:[7-10 Days]]
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
## 2021-07-29 NOTE — ED PROVIDER NOTE - PHYSICAL EXAMINATION
CONSTITUTIONAL: Well-developed; well-nourished; in no acute distress. gcs 15, speaking in full sentences, moving all extremities  SKIN: warm, dry 3cm laceration above the left eyebrow.  HEAD: Normocephalic; see above  EYES: PERRL, EOMI, no conjunctival erythema  ENT: No nasal discharge; airway clear, mucous membranes moist  NECK: c collar in place   CARD: +S1, S2 no murmurs, gallops, or rubs. Regular rate and rhythm. radial 2+ b/l, no chest wall tenderness or crepitus  RESP: No wheezes, rales or rhonchi. CTABL  ABD: soft ntnd, no rebound, no guarding, no rigidity  FAST negative  EXT: moves all extremities,  No clubbing, cyanosis or edema.   NEURO: Alert, oriented, grossly unremarkable, cn ii-xii grossly intact, follows commands  PSYCH: Cooperative, appropriate.

## 2021-07-29 NOTE — ED ADULT NURSE NOTE - NSIMPLEMENTINTERV_GEN_ALL_ED
Implemented All Universal Safety Interventions:  [place] to call system. Call bell, personal items and telephone within reach. Instruct patient to call for assistance. Room bathroom lighting operational. Non-slip footwear when patient is off stretcher. Physically safe environment: no spills, clutter or unnecessary equipment. Stretcher in lowest position, wheels locked, appropriate side rails in place.
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
## 2021-07-29 NOTE — ED PROVIDER NOTE - PROGRESS NOTE DETAILS
Kondrat- CT findings noted.  patient is aware of cancer diagnosis and had recent lymph node biopsy at [facility]. patient was advised to f/u with [facility] physician. no evidence of acute fracture on xray and nasal bone fracture on CT no other findings noted. ok to D/c with outpatient f/u

## 2021-09-07 ENCOUNTER — EMERGENCY (EMERGENCY)
Facility: HOSPITAL | Age: 61
LOS: 0 days | Discharge: HOME | End: 2021-09-07
Attending: EMERGENCY MEDICINE | Admitting: EMERGENCY MEDICINE
Payer: MEDICAID

## 2021-09-07 VITALS
SYSTOLIC BLOOD PRESSURE: 106 MMHG | WEIGHT: 145.06 LBS | TEMPERATURE: 98 F | OXYGEN SATURATION: 98 % | HEART RATE: 102 BPM | DIASTOLIC BLOOD PRESSURE: 71 MMHG | HEIGHT: 64 IN | RESPIRATION RATE: 20 BRPM

## 2021-09-07 DIAGNOSIS — M87.88 OTHER OSTEONECROSIS, OTHER SITE: ICD-10-CM

## 2021-09-07 DIAGNOSIS — F17.200 NICOTINE DEPENDENCE, UNSPECIFIED, UNCOMPLICATED: ICD-10-CM

## 2021-09-07 DIAGNOSIS — Z98.84 BARIATRIC SURGERY STATUS: Chronic | ICD-10-CM

## 2021-09-07 DIAGNOSIS — Z90.89 ACQUIRED ABSENCE OF OTHER ORGANS: Chronic | ICD-10-CM
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
## 2021-09-07 DIAGNOSIS — M16.11 UNILATERAL PRIMARY OSTEOARTHRITIS, RIGHT HIP: ICD-10-CM
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
## 2021-09-07 DIAGNOSIS — S01.112D LACERATION WITHOUT FOREIGN BODY OF LEFT EYELID AND PERIOCULAR AREA, SUBSEQUENT ENCOUNTER: ICD-10-CM

## 2021-09-07 DIAGNOSIS — M25.551 PAIN IN RIGHT HIP: ICD-10-CM

## 2021-09-07 DIAGNOSIS — Z90.710 ACQUIRED ABSENCE OF BOTH CERVIX AND UTERUS: Chronic | ICD-10-CM

## 2021-09-07 DIAGNOSIS — Z87.898 PERSONAL HISTORY OF OTHER SPECIFIED CONDITIONS: ICD-10-CM

## 2021-09-07 PROCEDURE — 99284 EMERGENCY DEPT VISIT MOD MDM: CPT

## 2021-09-07 RX ORDER — CYCLOBENZAPRINE HYDROCHLORIDE 10 MG/1
1 TABLET, FILM COATED ORAL
Qty: 21 | Refills: 0
Start: 2021-09-07 | End: 2021-09-13

## 2021-09-07 RX ORDER — METHOCARBAMOL 500 MG/1
1500 TABLET, FILM COATED ORAL ONCE
Refills: 0 | Status: COMPLETED | OUTPATIENT
Start: 2021-09-07 | End: 2021-09-07

## 2021-09-07 RX ORDER — IBUPROFEN 200 MG
1 TABLET ORAL
Qty: 28 | Refills: 0
Start: 2021-09-07 | End: 2021-09-13

## 2021-09-07 RX ORDER — KETOROLAC TROMETHAMINE 30 MG/ML
30 SYRINGE (ML) INJECTION ONCE
Refills: 0 | Status: DISCONTINUED | OUTPATIENT
Start: 2021-09-07 | End: 2021-09-07

## 2021-09-07 RX ADMIN — METHOCARBAMOL 1500 MILLIGRAM(S): 500 TABLET, FILM COATED ORAL at 12:52

## 2021-09-07 RX ADMIN — Medication 30 MILLIGRAM(S): at 12:51

## 2021-09-07 NOTE — ED PROVIDER NOTE - NSFOLLOWUPINSTRUCTIONS_ED_ALL_ED_FT
OSTEOARTHRITIS - AfterCare(R) Instructions(ER/ED)           Osteoarthritis    WHAT YOU NEED TO KNOW:    Osteoarthritis (OA) occurs when cartilage (tissue that cushions a joint) wears away slowly and causes the bones to rub together. OA is a long-term condition that often affects the hands, neck, lower back, knees, and hips. OA is also called arthrosis or degenerative joint disease.    DISCHARGE INSTRUCTIONS:    Call your doctor or specialist if:   •You have severe pain.      •You cannot move your joint.      •You have a fever.      •Your joint is red and tender.      •You have questions or concerns about your condition or care.      Medicines: You may need any of the following:   •Acetaminophen decreases pain and fever. It is available without a doctor's order. Ask how much to take and how often to take it. Follow directions. Read the labels of all other medicines you are using to see if they also contain acetaminophen, or ask your doctor or pharmacist. Acetaminophen can cause liver damage if not taken correctly. Do not use more than 4 grams (4,000 milligrams) total of acetaminophen in one day.       •NSAIDs, such as ibuprofen, help decrease swelling, pain, and fever. This medicine is available with or without a doctor's order. NSAIDs can cause stomach bleeding or kidney problems in certain people. If you take blood thinner medicine, always ask your healthcare provider if NSAIDs are safe for you. Always read the medicine label and follow directions.      •Capsaicin cream may help decrease pain in your joint.       •Prescription pain medicine may be given. Ask your healthcare provider how to take this medicine safely. Some prescription pain medicines contain acetaminophen. Do not take other medicines that contain acetaminophen without talking to your healthcare provider. Too much acetaminophen may cause liver damage. Prescription pain medicine may cause constipation. Ask your healthcare provider how to prevent or treat constipation.       •Take your medicine as directed. Contact your healthcare provider if you think your medicine is not helping or if you have side effects. Tell him or her if you are allergic to any medicine. Keep a list of the medicines, vitamins, and herbs you take. Include the amounts, and when and why you take them. Bring the list or the pill bottles to follow-up visits. Carry your medicine list with you in case of an emergency.      Follow up with your healthcare provider as directed: Write down your questions so you remember to ask them during your visits.    Go to physical therapy as directed: A physical therapist teaches you exercises to help improve movement and strength, and to decrease pain in your joints. The exercises also help lower your risk for loss of function. A physical therapist may move an area with his or her hands. For example, he or she may move your leg in certain ways to treat osteoarthritis in your hip.    Manage your symptoms:   •Stay active. Physical activity may reduce your pain and improve your ability to do daily activities. Avoid activities that cause pain. Ask your healthcare provider what type of exercise would be best for you.      •Maintain a healthy weight. This helps decrease the strain on the joints in your back, hips, knees, ankles, and feet. Ask your healthcare provider what a healthy weight is for you. He or she can help you create a weight loss plan if you are overweight.      •Use heat or ice on your joints as directed. Heat and ice help decrease pain, swelling, and muscle spasms. For heat, use a heating pad on a low setting for 20 minutes, or take a warm bath. For ice, use an ice pack, or put crushed ice in a plastic bag. Cover it with a towel before you place it on your joint. Use ice for 15 minutes every hour.      •Massage the muscles around the joint. Massage helps relieve pain and stiffness. Your healthcare provider or a physical therapist can show you how to do this. If you have hip OA, another person may need to help you massage the area.      •Use a cane, crutches, or a walker if directed. These help protect and relieve pressure on your ankle, knee, and hip joints. You may also be prescribed shoe inserts to decrease pressure in your joints.      •Wear flat or low-heeled shoes. This will help decrease pain and reduce pressure on your ankle, knee, and hip joints.

## 2021-09-07 NOTE — ED ADULT TRIAGE NOTE - CHIEF COMPLAINT QUOTE
c/o right hip pain, denies trauma, Hx arthritis, patient also states she needs a suture removed from left side of head since August.
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
## 2021-09-07 NOTE — ED ADULT TRIAGE NOTE - BRAND OF COVID-19 VACCINATION
Called patient, she states frequency of urination, thinks uti, dr usually puts lab order in.  Ordered urinalysis and patient will come in with specimen   Pf[employer] dose 1 and 2

## 2021-09-07 NOTE — ED PROVIDER NOTE - PATIENT PORTAL LINK FT
You can access the FollowMyHealth Patient Portal offered by [facility] by registering at the following website: [email or web address]. By joining [employer]’s FollowMyHealth portal, you will also be able to view your health information using other applications (apps) compatible with our system.

## 2021-09-07 NOTE — ED PROVIDER NOTE - NSFOLLOWUPCLINICS_GEN_ALL_ED_FT
[facility] ([facility])  Rehabilitation  [facility] [place] [address], [address] [place] [address]  Phone: [phone]  Fax:

## 2021-09-07 NOTE — ED PROVIDER NOTE - CARE PLAN
1 Principal Discharge DX:	Arthritis of right hip   Principal Discharge DX:	Arthritis of right hip  Secondary Diagnosis:	Visit for suture removal

## 2021-09-07 NOTE — ED PROVIDER NOTE - ATTENDING CONTRIBUTION TO CARE
I personally evaluated patient. I agree with the findings and plan with all documentation on chart except as documented  in my note.    No fever or signs of infectious etiology. Full ROM of hip.  Patient is a good candidate to attempt outpatient management. Supportive care and home care discussed in detail. Patient aware they may have to return for re-evaluation and possible admission if outpatient treatment fails. Strict return precautions discussed.    Full DC instructions discussed and patient knows when to seek immediate medical attention.  Patient has proper follow up.  All results discussed and patient aware they may require further work up.  Proper follow up ensured. Limitations of ED work up discussed.  Medications administered and prescribed/OTC home meds discussed.  All questions and concerns from patient or family addressed. Understanding of instructions verbalized.

## 2021-09-07 NOTE — ED PROVIDER NOTE - CLINICAL SUMMARY MEDICAL DECISION MAKING FREE TEXT BOX
No fever or signs of infectious etiology. Full ROM of hip.  Patient is a good candidate to attempt outpatient management. Supportive care and home care discussed in detail. Patient aware they may have to return for re-evaluation and possible admission if outpatient treatment fails. Strict return precautions discussed.    Full DC instructions discussed and patient knows when to seek immediate medical attention.  Patient has proper follow up.  All results discussed and patient aware they may require further work up.  Proper follow up ensured. Limitations of ED work up discussed.  Medications administered and prescribed/OTC home meds discussed.  All questions and concerns from patient or family addressed. Understanding of instructions verbalized.

## 2021-09-07 NOTE — ED PROVIDER NOTE - PHYSICAL EXAMINATION
CONST: Well appearing in NAD  EYES: PERRL, EOMI, Sclera and conjunctiva clear.   ENT: L eyebrow one dissolvable suture intact   CARD: Normal S1 S2; Normal rate and rhythm  RESP: Equal BS B/L, No wheezes, rhonchi or rales. No distress  MS: Crepitus to bilateral knees. No midline spinal tenderness.  SKIN: well healing laceration to L eyebrow  NEURO: A&Ox3, No focal deficits. Strength 5/5 with no sensory deficits. Antalgic gait, ambulatory with cane

## 2021-09-07 NOTE — ED PROVIDER NOTE - NS ED ROS FT
CONST: No fever, chills or bodyaches  CARD: No chest pain, palpitations  RESP: No SOB, cough  GI: No abdominal pain, N/V/D  MS: see HPI  SKIN: suture to L eyebrow  NEURO: No paresthesias

## 2021-12-02 ENCOUNTER — EMERGENCY (EMERGENCY)
Facility: HOSPITAL | Age: 61
LOS: 0 days | Discharge: HOME | End: 2021-12-02
Attending: EMERGENCY MEDICINE | Admitting: EMERGENCY MEDICINE
Payer: MEDICAID

## 2021-12-02 VITALS
TEMPERATURE: 98 F | SYSTOLIC BLOOD PRESSURE: 121 MMHG | RESPIRATION RATE: 20 BRPM | HEART RATE: 110 BPM | HEIGHT: 64 IN | DIASTOLIC BLOOD PRESSURE: 60 MMHG | OXYGEN SATURATION: 95 %

## 2021-12-02 DIAGNOSIS — Z90.89 ACQUIRED ABSENCE OF OTHER ORGANS: Chronic | ICD-10-CM

## 2021-12-02 DIAGNOSIS — Z98.84 BARIATRIC SURGERY STATUS: Chronic | ICD-10-CM

## 2021-12-02 DIAGNOSIS — Z90.710 ACQUIRED ABSENCE OF BOTH CERVIX AND UTERUS: Chronic | ICD-10-CM

## 2021-12-02 DIAGNOSIS — E11.9 TYPE 2 DIABETES MELLITUS WITHOUT COMPLICATIONS: ICD-10-CM

## 2021-12-02 DIAGNOSIS — Z85.41 PERSONAL HISTORY OF MALIGNANT NEOPLASM OF CERVIX UTERI: ICD-10-CM

## 2021-12-02 DIAGNOSIS — Z79.82 LONG TERM (CURRENT) USE OF ASPIRIN: ICD-10-CM

## 2021-12-02 DIAGNOSIS — M54.9 DORSALGIA, UNSPECIFIED: ICD-10-CM

## 2021-12-02 PROCEDURE — 99284 EMERGENCY DEPT VISIT MOD MDM: CPT

## 2021-12-02 RX ORDER — KETOROLAC TROMETHAMINE 30 MG/ML
30 SYRINGE (ML) INJECTION ONCE
Refills: 0 | Status: DISCONTINUED | OUTPATIENT
Start: 2021-12-02 | End: 2021-12-02

## 2021-12-02 RX ORDER — METHOCARBAMOL 500 MG/1
1 TABLET, FILM COATED ORAL
Qty: 28 | Refills: 0
Start: 2021-12-02 | End: 2021-12-08

## 2021-12-02 RX ORDER — METHOCARBAMOL 500 MG/1
1000 TABLET, FILM COATED ORAL ONCE
Refills: 0 | Status: COMPLETED | OUTPATIENT
Start: 2021-12-02 | End: 2021-12-02

## 2021-12-02 RX ADMIN — METHOCARBAMOL 1000 MILLIGRAM(S): 500 TABLET, FILM COATED ORAL at 16:35

## 2021-12-02 RX ADMIN — Medication 30 MILLIGRAM(S): at 16:35

## 2021-12-02 NOTE — ED PROVIDER NOTE - NS ED ROS FT
Constitutional: (-) fever (-) chills (-) (-) lightheadedness   Eyes/ENT: (-) blurry vision, (-) epistaxis (-) rhinorrhea (-) nasal congestion  Cardiovascular: (-) chest pain, (-) syncope (-) palpitations   Respiratory: (-) cough, (-) shortness of breath (-) pleurisy   Gastrointestinal: (-) vomiting, (-) diarrhea (-) abdominal pain (-) nausea (-) anorexia  Musculoskeletal: (-) neck pain, (+) back pain, (-) joint pain (-) joint swelling (-) painful ROM  Integumentary: (-) rash, (-) edema (-) lacerations (-) pruritis   Neurological: (-) headache, (-) altered mental status (-) LOC (-) dizziness (-) paresthesias (-) gait abnormalities

## 2021-12-02 NOTE — ED PROVIDER NOTE - PROGRESS NOTE DETAILS
dc: pain alleviated by Toradol and robaxin. plan to send rx to [facility] [place].

## 2021-12-02 NOTE — ED PROVIDER NOTE - PHYSICAL EXAMINATION
Physical Exam    Vital Signs: I have reviewed the initial vital signs.  Constitutional: well-nourished, appears stated age, no acute distress  Eyes: Conjunctiva pink, Sclera clear  Cardiovascular: S1 and S2, regular rate, regular rhythm, well-perfused extremities, radial pulses equal and 2+, calves nonttp, equal in size  Respiratory: unlabored respiratory effort, speaking in full sentences, handling oral secretions, clear to auscultation bilaterally no wheezing, rales and rhonchi  Gastrointestinal: soft, non-tender abdomen, no CVAT  Musculoskeletal: supple neck, no lower extremity edema, no midline tenderness, + R sided reproducible paraspinal tenderness, no gross bony deformities or swelling. + R sided straight leg raise   Integumentary: warm, dry, no rashes, lacerations,  Neurologic: awake, alert x 3, nonataxic gait, equal strength and sensation
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
## 2021-12-02 NOTE — ED ADULT NURSE NOTE - OBJECTIVE STATEMENT
Patient presents with c/o R lower back, knee and hip pain. Patient states she has had back pain for a long time and required surgery but has had medical complications that have prevented it from happening. Requesting Toradol. R knee immobilizer noted.

## 2021-12-02 NOTE — ED PROVIDER NOTE - CLINICAL SUMMARY MEDICAL DECISION MAKING FREE TEXT BOX
61-year-old female presents to the ED for back pain.  Physical exam consistent with right-sided reproducible paraspinal tenderness.  No focal neurological deficits.  Given Toradol with moderate pain relief.  Discharged home.

## 2021-12-02 NOTE — ED PROVIDER NOTE - NSFOLLOWUPCLINICS_GEN_ALL_ED_FT
[facility] ([facility])  Rehabilitation  [facility] [place] [address], [address] [place] [address]  Phone: [phone]  Fax:     [facility] ([facility])  Rehabilitation  [address] [place] [id number]  Phone: [phone]  Fax:

## 2021-12-02 NOTE — ED PROVIDER NOTE - NSFOLLOWUPINSTRUCTIONS_ED_ALL_ED_FT
MAKE AN APPOINTMENT WITH YOUR DOCTOR FOR YOUR BACK. MAKE AN APPOINTMENT WIT PHYSICAL THERAPY. TAKE YOUR MEDICATIONS AS DIRECTED.    Back Pain    WHAT YOU NEED TO KNOW:    Back pain is common. It can be caused by many conditions, such as arthritis or the breakdown of spinal discs. Your risk for back pain is increased by injuries, lack of activity, or repeated bending and twisting. You may feel sore or stiff on one or both sides of your back. The pain may spread to your buttocks or thighs.    DISCHARGE INSTRUCTIONS:    Return to the emergency department if:     You have pain, numbness, or weakness in one or both legs.      Your pain becomes so severe that you cannot walk.      You cannot control your urine or bowel movements.      You have severe back pain with chest pain.      You have severe back pain, nausea, and vomiting.      You have severe back pain that spreads to your side or genital area.    Contact your healthcare provider if:     You have back pain that does not get better with rest and pain medicine.      You have a fever.      You have pain that worsens when you are on your back or when you rest.      You have pain that worsens when you cough or sneeze.      You lose weight without trying.      You have questions or concerns about your condition or care.    Medicines:     NSAIDs help decrease swelling and pain. This medicine is available with or without a doctor's order. NSAIDs can cause stomach bleeding or kidney problems in certain people. If you take blood thinner medicine, always ask your healthcare provider if NSAIDs are safe for you. Always read the medicine label and follow directions.      Acetaminophen decreases pain and fever. It is available without a doctor's order. Ask how much to take and how often to take it. Follow directions. Read the labels of all other medicines you are using to see if they also contain acetaminophen, or ask your doctor or pharmacist. Acetaminophen can cause liver damage if not taken correctly. Do not use more than 4 grams (4,000 milligrams) total of acetaminophen in one day.       Muscle relaxers help decrease muscle spasms and back pain.      Prescription pain medicine may be given. Ask your healthcare provider how to take this medicine safely. Some prescription pain medicines contain acetaminophen. Do not take other medicines that contain acetaminophen without talking to your healthcare provider. Too much acetaminophen may cause liver damage. Prescription pain medicine may cause constipation. Ask your healthcare provider how to prevent or treat constipation.       Take your medicine as directed. Contact your healthcare provider if you think your medicine is not helping or if you have side effects. Tell him or her if you are allergic to any medicine. Keep a list of the medicines, vitamins, and herbs you take. Include the amounts, and when and why you take them. Bring the list or the pill bottles to follow-up visits. Carry your medicine list with you in case of an emergency.    How to manage your back pain:     Apply ice on your back for 15 to 20 minutes every hour or as directed. Use an ice pack, or put crushed ice in a plastic bag. Cover it with a towel before you apply it to your skin. Ice helps prevent tissue damage and decreases pain.      Apply heat on your back for 20 to 30 minutes every 2 hours for as many days as directed. Heat helps decrease pain and muscle spasms.      Stay active as much as you can without causing more pain. Bed rest could make your back pain worse. Avoid heavy lifting until your pain is gone.      Go to physical therapy as directed. A physical therapist can teach you exercises to help improve movement and strength, and to decrease pain.    Follow up with your healthcare provider in 2 weeks, or as directed: Write down your questions so you remember to ask them during your visits.       © Copyright [employer] 2019 All illustrations and images included in CareNotes are the copyrighted property of [employer] or [employer].

## 2021-12-02 NOTE — ED PROVIDER NOTE - PATIENT PORTAL LINK FT
You can access the FollowMyHealth Patient Portal offered by [facility] by registering at the following website: [email or web address]. By joining [employer]’s FollowMyHealth portal, you will also be able to view your health information using other applications (apps) compatible with our system.

## 2022-03-15 NOTE — PATIENT PROFILE ADULT - NSPROHMCARDIOMGMTSTRAT_GEN_A_NUR
INBOUND CALL: VM received from patient. Pt has a couple of questions for RNCC. She is wondering if pre-procedure covid test can be done in 3/21 when she is getting labs for Dr. Reinoso. She is also wondering if she needs a pregnancy test. Lastly, she wants to confirm that the reason she doesn't have infusions ordered is because they are giving her a week of with the ablation. Callback requested    OUTBOUND CALL: RNCC called patient. RNCC confirmed that there is a covid test in her chart that Dr. Thomas ordered on 2/28. RNCC adjusted appt noted for 3/21 labs so that covid test is done and she gets MM labs as well. Plan for Dr. Reinoso to review them on 3/23 and decide POC from there. Will schedule chemo afterwards.   Pt states she had a new Mirena placed. RNCC will follow up with OCT about HCG test and let her know. Patient verbalizes understanding of POC and has no other questions or concerns at this time.     Griselda Valdez, RN, MSN, OCN   RN Care Coordinator   [facility] [address] [id number] [phone]             
medication therapy

## 2022-04-18 ENCOUNTER — TRANSCRIPTION ENCOUNTER (OUTPATIENT)
Age: 62
End: 2022-04-18

## 2022-04-18 NOTE — ED ADULT TRIAGE NOTE - CCCP TRG CHIEF CMPLNT
CC:  Stephane Walsh is here today for 18 year well exam.    Medications: currently is not taking any medications  Refills needed today?  NO  denies Latex allergy or sensitivity    Health Maintenance Due   Topic Date Due   • Annual Physical (ages 3-18)  Never done   • COVID-19 Vaccine (1) Never done   • Meningococcal Serogroup B Vaccine (2 of 2 - Risk Bexsero 2-dose series) 08/17/2021       Patient is due for topics listed above, he wishes to proceed with Annual Wellness Visit (ages 3-18), but is not proceeding with Immunization(s) COVID-19 and Meningococcal Serogroup B at this time. The following has occurred: Patient has refused his second Bexsero vaccine today.                             back pain general

## 2022-12-05 NOTE — ED ADULT TRIAGE NOTE - SPO2 (%)
Seen patient in the ER complaints of sever pain in the left foot  Hurts in to the leg    Left foot with ulcerated 4th and 5th toe wit radiographic evidence of Osteo in the 4th toe and clinical evidence of Osteo in the 3rd toe as well with ascending cellulitis      Reviewed the data advised patient for surgical treatment with 4th and 3rd partial resections , verbally consented for the same    EXAM: XR FOOT LT MIN 3 V     INDICATION: pain infection left second toe-the fifth toe has been previously  removed. .     COMPARISON: Left foot radiograph 5/21/2022     FINDINGS: Three views of the left foot demonstrate there is extensive ostial  lysis of the fourth toe involving the proximal, middle, and distal phalanges  with associated mildly displaced age indeterminant fracture through the proximal  phalanx. There is overlying soft tissue swelling. Osteopenia. Prominent or  sclerotic vascular calcifications. Prominent plantar and Achilles calcaneal  enthesophytes. IMPRESSION  1. Findings consistent with osteomyelitis of the fourth proximal, middle, and  distal phalanges with marked osteolysis (particularly of the distal phalanx) and  age indeterminant mildly displaced fracture through the proximal phalanx.       Scheduled for the procedure tomorrow late afternoon     Npo and consent orders placed     Hold anticoagulants please     Full note to follow
98

## 2022-12-29 ENCOUNTER — APPOINTMENT (OUTPATIENT)
Dept: ORTHOPEDIC SURGERY | Facility: CLINIC | Age: 62
End: 2022-12-29

## 2023-01-03 ENCOUNTER — EMERGENCY (EMERGENCY)
Facility: HOSPITAL | Age: 63
LOS: 0 days | Discharge: HOME | End: 2023-01-03
Attending: EMERGENCY MEDICINE | Admitting: EMERGENCY MEDICINE
Payer: MEDICAID

## 2023-01-03 VITALS
SYSTOLIC BLOOD PRESSURE: 145 MMHG | HEART RATE: 90 BPM | DIASTOLIC BLOOD PRESSURE: 61 MMHG | TEMPERATURE: 98 F | RESPIRATION RATE: 20 BRPM | OXYGEN SATURATION: 99 %

## 2023-01-03 VITALS
SYSTOLIC BLOOD PRESSURE: 165 MMHG | TEMPERATURE: 96 F | HEART RATE: 111 BPM | OXYGEN SATURATION: 97 % | DIASTOLIC BLOOD PRESSURE: 83 MMHG | RESPIRATION RATE: 20 BRPM
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
## 2023-01-03 DIAGNOSIS — Z98.84 BARIATRIC SURGERY STATUS: ICD-10-CM

## 2023-01-03 DIAGNOSIS — Z90.09 ACQUIRED ABSENCE OF OTHER PART OF HEAD AND NECK: ICD-10-CM

## 2023-01-03 DIAGNOSIS — Z85.41 PERSONAL HISTORY OF MALIGNANT NEOPLASM OF CERVIX UTERI: ICD-10-CM

## 2023-01-03 DIAGNOSIS — Z79.82 LONG TERM (CURRENT) USE OF ASPIRIN: ICD-10-CM

## 2023-01-03 DIAGNOSIS — R73.03 PREDIABETES: ICD-10-CM

## 2023-01-03 DIAGNOSIS — Z90.89 ACQUIRED ABSENCE OF OTHER ORGANS: Chronic | ICD-10-CM

## 2023-01-03 DIAGNOSIS — Z86.19 PERSONAL HISTORY OF OTHER INFECTIOUS AND PARASITIC DISEASES: ICD-10-CM

## 2023-01-03 DIAGNOSIS — Z86.59 PERSONAL HISTORY OF OTHER MENTAL AND BEHAVIORAL DISORDERS: ICD-10-CM

## 2023-01-03 DIAGNOSIS — Z87.891 PERSONAL HISTORY OF NICOTINE DEPENDENCE: ICD-10-CM

## 2023-01-03 DIAGNOSIS — T40.3X1A POISONING BY METHADONE, ACCIDENTAL (UNINTENTIONAL), INITIAL ENCOUNTER: ICD-10-CM

## 2023-01-03 DIAGNOSIS — Z90.710 ACQUIRED ABSENCE OF BOTH CERVIX AND UTERUS: ICD-10-CM

## 2023-01-03 DIAGNOSIS — M87.9 OSTEONECROSIS, UNSPECIFIED: ICD-10-CM

## 2023-01-03 DIAGNOSIS — Z98.84 BARIATRIC SURGERY STATUS: Chronic | ICD-10-CM
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
## 2023-01-03 DIAGNOSIS — Z90.710 ACQUIRED ABSENCE OF BOTH CERVIX AND UTERUS: Chronic | ICD-10-CM

## 2023-01-03 PROCEDURE — 99284 EMERGENCY DEPT VISIT MOD MDM: CPT

## 2023-01-03 PROCEDURE — 93010 ELECTROCARDIOGRAM REPORT: CPT

## 2023-01-03 RX ORDER — KETOROLAC TROMETHAMINE 30 MG/ML
60 SYRINGE (ML) INJECTION ONCE
Refills: 0 | Status: DISCONTINUED | OUTPATIENT
Start: 2023-01-03 | End: 2023-01-03

## 2023-01-03 RX ADMIN — Medication 60 MILLIGRAM(S): at 13:49

## 2023-01-03 NOTE — ED PROVIDER NOTE - CARE PROVIDER_API CALL
Thony oLzano (MD)  Anesthesiology; Pain Medicine  [address] [place] [address]  Phone: [phone]  Fax: [phone]  Follow Up Time: Routine

## 2023-01-03 NOTE — ED PROVIDER NOTE - PATIENT PORTAL LINK FT
You can access the FollowMyHealth Patient Portal offered by [facility] by registering at the following website: [email or web address]. By joining [employer]’s FollowMyHealth portal, you will also be able to view your health information using other applications (apps) compatible with our system.

## 2023-01-03 NOTE — ED PROVIDER NOTE - PHYSICAL EXAMINATION
CONSTITUTIONAL: nontoxic appearing, in no acute distress  HEAD:  normocephalic, atraumatic  EYES:  no conjunctival injection, no eye discharge, tracking well  ENT:  tympanic membranes intact bilaterally, moist mucous membranes, no oropharyngeal ulcerations or lesions, no tonsillar swelling or erythema, no tonsillar exudates  NECK:  supple, no masses, no tender anterior/posterior cervical lymphadenopathy  CV:  regular rate and rhythm, cap refill < 2 seconds  RESP:  normal respiratory effort, lungs clear to auscultation bilaterally, no wheezes, no crackles, no retractions, no stridor  ABD:  soft, nontender, nondistended, no masses, no organomegaly  LYMPH:  no significant lymphadenopathy  MSK/NEURO:  normal movement, normal tone  SKIN:  warm, dry, no rash

## 2023-01-03 NOTE — ED PROVIDER NOTE - CLINICAL SUMMARY MEDICAL DECISION MAKING FREE TEXT BOX
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  Discussed rare condition that can happen post lasers. Discussed potential etiologies but ultimately unknown. Discussed could continue to occur but typically continuing treatment with laser will improve those areas.  Increase in hair predominately on the cheeks and neck upper lip and chin improved.  Discusedd may need to increase energies v multiple passes. Will restart at. Similar settings with more cooling and return in a month for further treatment 62-year-old female with history of heroin abuse, osteonecrosis, presents with report of opioid overdose. She states she has had her chronic bilateral knee pain secondary to osteonecrosis, and today took 110 mg of oral methadone given to her by someone off the street. Per EMS, she was found unresponsive, by [employer] given 4 mg intranasal Narcan with resolution of symptoms and by the time of EMS arrival she was alert and responsive. Patient denies all changes in her chronic symptoms, shortness of breath, chest pain, vomiting, diarrhea, fever, urinary symptoms, and all other symptoms. On exam, afebrile, hemodynamically stable, saturating well on room air, NAD, well appearing, sitting comfortably in bed, no WOB, speaking full sentences, head NCAT, EOMI grossly, anicteric, MMM, no JVD, RRR, nml S1/S2, no m/r/g, lungs CTAB, no w/r/r, abd soft, NT, ND, nml BS, no rebound or guarding, AAO, CN's 3-12 grossly intact, [place] spontaneously, no leg cyanosis or edema, skin warm, well perfused, no rashes or hives. No evidence of pulmonary edema. No evidence of trauma. No arrhythmia. Appears well-hydrated, and fingerstick within normal limits. Patient with her baseline chronic symptoms and will follow up with Ortho and pain management. Observed for 3.5 hrs following which she eloped then returned for discharge. Patient is well appearing, NAD, afebrile, hemodynamically stable. Any available tests and studies were discussed with patient. Discharged with instructions in further symptomatic care, return precautions, and need for f/u.

## 2023-01-03 NOTE — ED ADULT NURSE NOTE - NSIMPLEMENTINTERV_GEN_ALL_ED
Implemented All Fall with Harm Risk Interventions:  [place] to call system. Call bell, personal items and telephone within reach. Instruct patient to call for assistance. Room bathroom lighting operational. Non-slip footwear when patient is off stretcher. Physically safe environment: no spills, clutter or unnecessary equipment. Stretcher in lowest position, wheels locked, appropriate side rails in place. Provide visual cue, wrist band, yellow gown, etc. Monitor gait and stability. Monitor for mental status changes and reorient to person, place, and time. Review medications for side effects contributing to fall risk. Reinforce activity limits and safety measures with patient and family. Provide visual clues: red socks.

## 2023-01-03 NOTE — ED PROVIDER NOTE - NSFOLLOWUPINSTRUCTIONS_ED_ALL_ED_FT
[facility]     Opioid Overdose     Opioids are drugs that are often used to treat pain. Opioids include illegal drugs, such as heroin, as well as prescription pain medicines, such as codeine, morphine, hydrocodone, and fentanyl.    An opioid overdose happens when you take too much of an opioid. An overdose may be intentional or accidental and can happen with any type of opioid.    The effects of an overdose can be mild, dangerous, or even deadly. Opioid overdose is a medical emergency.     What are the causes?    This condition may be caused by: •Taking too much of an opioid on purpose.     •Taking too much of an opioid by accident.     •Using two or more substances that contain opioids at the same time.     •Taking an opioid with a substance that affects your heart, breathing, or blood pressure. These include alcohol, tranquilizers, sleeping pills, illegal drugs, and some over-the-counter medicines.     This condition may also happen due to an error made by: •A health care provider who prescribes a medicine.     •The pharmacist who fills the prescription.      What increases the risk?    This condition is more likely in: •Children. They may be attracted to colorful pills. Because of a child's small size, even a small amount of a medicine can be dangerous.     •Older people. They may be taking many different medicines. Older people may have difficulty reading labels or remembering when they last took their medicines. They may also be more sensitive to the effects of opioids.     •People with chronic medical conditions, especially heart, liver, kidney, or neurological diseases.     •People who take an opioid for a long period of time.     •People who take opioids and use illegal drugs, such as heroin, or other substances, such as alcohol.    •People who: •Have a history of drug or alcohol abuse.     •Have certain mental health conditions.     •Have a history of previous drug overdoses.      •People who take opioids that are not prescribed for them.      What are the signs or symptoms?    Symptoms of this condition depend on the type of opioid and the amount that was taken. Common symptoms include: •Sleepiness or difficulty waking from sleep.     •Confusion.     •Slurred speech.     •Slowed breathing and a slow pulse (bradycardia).     •Nausea and vomiting.     •Abnormally small pupils.     Signs and symptoms that require emergency treatment include: •Cold, clammy, and pale skin.     •Blue lips and fingernails.     •Vomiting.     •Gurgling sounds in the throat.     •A pulse that is very slow or difficult to detect.     •Breathing that is very irregular, slow, noisy, or difficult to detect.     •Inability to respond to speech or be awakened from sleep (stupor).     •Seizures.      How is this diagnosed?    This condition is diagnosed based on your symptoms and medical history. It is important to tell your health care provider: •About all of the opioids that you took.     •When you took the opioids.     •Whether you were drinking alcohol or using marijuana, cocaine, or other drugs.     Your health care provider will do a physical exam. This exam may include: •Checking and monitoring your heart rate and rhythm, breathing rate, temperature, and blood pressure.     •Measuring oxygen levels in your blood.     •Checking for abnormally small pupils.     You may also have blood tests or urine tests. You may have X-rays if you are having severe breathing problems.     How is this treated?    This condition requires immediate medical treatment and hospitalization. Reversing the effects of the opioid is the first step in treatment. If you have a Narcan kit or naloxone, use it right away. Follow your health care provider's instructions. A friend or family member can also help you with this.    The rest of your treatment will be given in the hospital intensive care (ICU). Treatment in the hospital may include: •Giving salts and minerals (electrolytes) along with fluids through an IV.     •Inserting a breathing tube (endotracheal tube) in your airway to help you breathe if you cannot breathe on your own or you are in danger of not being able to breathe on your own.     •Giving oxygen through a small tube under your nose.     •Passing a tube through your nose and into your stomach (nasogastric tube, or NG tube) to empty your stomach.    •Giving medicines that: •Increase your blood pressure.     •Relieve nausea and vomiting.     •Relieve abdominal pain and cramping.     •Reverse the effects of the opioid (naloxone).      •Monitoring your heart and oxygen levels.     •Ongoing counseling and mental health support if you intentionally overdosed or used an illegal drug.      Follow these instructions at home: Three cups showing dark yellow, yellow, and pale yellow urine. Medicines     •Take over-the-counter and prescription medicines only as told by your health care provider.     •Always ask your health care provider about possible side effects and interactions of any new medicine that you start taking.     •Keep a list of all the medicines that you take, including over-the-counter medicines. Bring this list with you to all your medical visits.     General instructions     •Drink enough fluid to keep your urine pale yellow.     •Keep all follow-up visits. This is important.      How is this prevented?    •Read the drug inserts that come with your opioid pain medicines.     •Take medicines only as told by your health care provider. Do not take more medicine than you are told. Do not take medicines more frequently than you are told.     • Do not drink alcohol or take sedatives when taking opioids.     • Do not use illegal or recreational drugs, including cocaine, ecstasy, and marijuana.     • Do not take opioid medicines that are not prescribed for you.     •Store all medicines in safety containers that are out of the reach of children.    •Get help if you are struggling with: •Alcohol or drug use.     •Depression or another mental health problem.     •Thoughts of hurting yourself or another person.      •Keep the phone number of your local poison control center near your phone or in your mobile phone. In the [place] the hotline of the [facility] is (881) 132-5362.     •If you were prescribed naloxone, make sure you understand how to take it.      Contact a health care provider if:    •You need help understanding how to take your pain medicines.     •You feel your medicines are too strong.     •You are concerned that your pain medicines are not working well for your pain.     •You develop new symptoms or side effects when you are taking medicines.      Get help right away if:    •You or someone else is having symptoms of an opioid overdose. Get help even if you are not sure.     •You have thoughts about hurting yourself or others.    •You have: •Chest pain.     •Difficulty breathing.     •A loss of consciousness.      These symptoms may represent a serious problem that is an emergency. Do not wait to see if the symptoms will go away. Get medical help right away. Call your local emergency services (882 in the [place] Do not drive yourself to the hospital.     If you ever feel like you may hurt yourself or others, or have thoughts about taking your own life, get help right away. You can go to your nearest emergency department or: • Call your local emergency services (962 in the [place]     • Call a suicide crisis helpline, such as the National Suicide Prevention Lifeline at 1-942.368.8347 or 988 in the [place] This is open 24 hours a day in the [place]     • Text the Crisis Text Line at 184746 (in the [place]      Summary    •Opioids are drugs that are often used to treat pain. Opioids include illegal drugs, such as heroin, as well as prescription pain medicines.     •An opioid overdose happens when you take too much of an opioid.     •Overdoses can be intentional or accidental.     •Opioid overdose is very dangerous. It is a life-threatening emergency.     •If you or someone you know is experiencing an opioid overdose, get help right away.     This information is not intended to replace advice given to you by your health care provider. Make sure you discuss any questions you have with your health care provider.     Document Revised: 07/13/2022 Document Reviewed: 03/30/2022    Elsevier Patient Education © 2022 [employer]

## 2023-01-03 NOTE — ED PROVIDER NOTE - OBJECTIVE STATEMENT
62-year-old female with history of heroin abuse, osteonecrosis in multiple joints, presents with reported opioid overdose. pt states that she has been taking naproxen for her pain but it has been causing GI upset and she has had difficulty establishing care w/ pain management; today she took 110mg of oral methadone from someone off the street. Per EMS, she was found unresponsive, given 4 mg intranasal Narcan by EMS with resolution of symptoms. Patient denies changes in her chronic symptoms, shortness of breath, chest pain, vomiting, diarrhea, fever, urinary symptoms, and all other symptoms.

## 2023-01-03 NOTE — ED ADULT NURSE NOTE - CHIEF COMPLAINT QUOTE
Pt took Methadone 100 mg today for back pain, became unresponsive, was given Narcan 4mg by ems pta, pt now aox4

## 2023-01-03 NOTE — ED ADULT NURSE NOTE - OBJECTIVE STATEMENT
Patient is a 62 year old female BIBA s/p taking 110mg of methadone- patient states she has bad back chronic back pain and  took the methadone this morning- given 4mg narcan en route. Patient AOx4

## 2023-01-03 NOTE — ED PROVIDER NOTE - ATTENDING CONTRIBUTION TO CARE
62-year-old female with history of heroin abuse, osteonecrosis, presents with report of opioid overdose. She states she has had her chronic bilateral knee pain secondary to osteonecrosis, and today took 110 mg of oral methadone given to her by someone off the street. Per EMS, she was found unresponsive, by [employer] given 4 mg intranasal Narcan with resolution of symptoms and by the time of EMS arrival she was alert and responsive. Patient denies all changes in her chronic symptoms, shortness of breath, chest pain, vomiting, diarrhea, fever, urinary symptoms, and all other symptoms. On exam, afebrile, hemodynamically stable, saturating well on room air, NAD, well appearing, sitting comfortably in bed, no WOB, speaking full sentences, head NCAT, EOMI grossly, anicteric, MMM, no JVD, RRR, nml S1/S2, no m/r/g, lungs CTAB, no w/r/r, abd soft, NT, ND, nml BS, no rebound or guarding, AAO, CN's 3-12 grossly intact, [place] spontaneously, no leg cyanosis or edema, skin warm, well perfused, no rashes or hives. No evidence of pulmonary edema. No evidence of trauma. No arrhythmia. Appears well-hydrated, and fingerstick within normal limits. Patient with her baseline chronic symptoms and will follow up with Ortho and pain management. Observed    No evidence of withdrawal here. 62-year-old female with history of heroin abuse, osteonecrosis, presents with report of opioid overdose. She states she has had her chronic bilateral knee pain secondary to osteonecrosis, and today took 110 mg of oral methadone given to her by someone off the street. Per EMS, she was found unresponsive, by [employer] given 4 mg intranasal Narcan with resolution of symptoms and by the time of EMS arrival she was alert and responsive. Patient denies all changes in her chronic symptoms, shortness of breath, chest pain, vomiting, diarrhea, fever, urinary symptoms, and all other symptoms. On exam, afebrile, hemodynamically stable, saturating well on room air, NAD, well appearing, sitting comfortably in bed, no WOB, speaking full sentences, head NCAT, EOMI grossly, anicteric, MMM, no JVD, RRR, nml S1/S2, no m/r/g, lungs CTAB, no w/r/r, abd soft, NT, ND, nml BS, no rebound or guarding, AAO, CN's 3-12 grossly intact, [place] spontaneously, no leg cyanosis or edema, skin warm, well perfused, no rashes or hives. No evidence of pulmonary edema. No evidence of trauma. No arrhythmia. Appears well-hydrated, and fingerstick within normal limits. Patient with her baseline chronic symptoms and will follow up with Ortho and pain management. Observed for 3.5 hrs following which she eloped then returned for discharge. Patient is well appearing, NAD, afebrile, hemodynamically stable. Any available tests and studies were discussed with patient. Discharged with instructions in further symptomatic care, return precautions, and need for f/u.

## 2023-01-03 NOTE — ED ADULT TRIAGE NOTE - CHIEF COMPLAINT QUOTE
Pt took Methadone 100 mg today for back pain, became unresponsive, was given Narcan 4mg by ems pta Pt took Methadone 100 mg today for back pain, became unresponsive, was given Narcan 4mg by ems pta, pt now aox4

## 2023-01-18 ENCOUNTER — APPOINTMENT (OUTPATIENT)
Dept: ORTHOPEDIC SURGERY | Facility: CLINIC | Age: 63
End: 2023-01-18
Payer: MEDICAID

## 2023-01-18 PROCEDURE — 99204 OFFICE O/P NEW MOD 45 MIN: CPT

## 2023-01-18 PROCEDURE — 72110 X-RAY EXAM L-2 SPINE 4/>VWS: CPT

## 2023-01-18 PROCEDURE — 99214 OFFICE O/P EST MOD 30 MIN: CPT

## 2023-01-18 NOTE — DATA REVIEWED
[FreeTextEntry1] : I reviewed the patient's AP lateral flexion-extension lumbar x-rays that obtained in the office.  She has degenerative scoliosis.  She has degenerative disc disease.  No instability on flexion-extension.  I obtained the patient's MRI of her lumbar spine from 2021 done at [facility] radiology and reviewed that she has moderate spinal stenosis at L4-5 and L5-S1 has a broad-based disc herniation.  There is degenerative disc disease.

## 2023-01-18 NOTE — HISTORY OF PRESENT ILLNESS
[de-identified] : 62-year-old female presents with a longstanding history of back pain.  The patient feels like the pain radiates down her buttocks on the right and into her right thigh and stops just above the knee.  She denies numbness and tingling.  The patient states that she also has cramping and aching in her legs when she walks such that she has to sit down to get relief.  When she is at the supermarket she bends forward on her shopping cart.  She can walk only about 2-3 blocks before she has to find a place to sit from the leg pain.  She has been struggling with this for very long time.  She has an MRI of her lumbar spine from 2021 but no updated imaging.  In the past she has gotten injections and had been treated by pain management.  She is also asking for a refill of her Flexeril prescription which she feels helps her.  She denies loss of bladder or bowel.  She denies numbness and tingling in the saddle area.  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

## 2023-01-18 NOTE — PHYSICAL EXAM
[de-identified] : TTP midline spine and paraspinal musculature \par Strength                                         \par Hip flexor\par   Right: 5/5; Left: 5/5                             \par Knee extensor  \par   Right: 5/5; Left: 5/5                     \par Ankle dorsiflexion\par   Right: 5/5; Left: 5/5                  \par EHL        \par   Right: 5/5; Left: 5/5                                \par Ankle plantarflexion    \par   Right: 5/5; Left: 5/5\par \par Sensation\par L1\par   Right: 2/2; Left: 2/2\par L2\par   Right: 2/2; Left: 2/2\par L3\par   Right: 2/2; Left: 2/2\par L4\par   Right: 2/2; Left: 2/2\par L5\par   Right: 2/2; Left: 2/2\par S1\par   Right: 2/2; Left: 2/2\par \par Reflexes\par Patella\par   Right: 2+; Left 2+\par Achilles\par   Right: 2+; Left 2+\par Clonus\par  Right: absent; L: absent\par

## 2023-01-18 NOTE — DISCUSSION/SUMMARY
[de-identified] : 62-year-old female with symptoms of lumbar radiculopathy in her right leg and neurogenic claudication.  Given that the patient does not have an updated MRI of her lumbar spine I will order this.  I will also send her to see Dr. Flores from pain management.  I would like to see the patient back after the MRI of her lumbar spine is complete to go over the results with her.

## 2023-01-25 ENCOUNTER — LABORATORY RESULT (OUTPATIENT)
Age: 63
End: 2023-01-25

## 2023-01-25 ENCOUNTER — APPOINTMENT (OUTPATIENT)
Dept: PAIN MANAGEMENT | Facility: CLINIC | Age: 63
End: 2023-01-25
Payer: MEDICAID

## 2023-01-25 VITALS — WEIGHT: 140 LBS | BODY MASS INDEX: 23.9 KG/M2 | HEIGHT: 64 IN

## 2023-01-25 DIAGNOSIS — M70.61 TROCHANTERIC BURSITIS, RIGHT HIP: ICD-10-CM

## 2023-01-25 DIAGNOSIS — M54.16 RADICULOPATHY, LUMBAR REGION: ICD-10-CM

## 2023-01-25 DIAGNOSIS — F17.200 NICOTINE DEPENDENCE, UNSPECIFIED, UNCOMPLICATED: ICD-10-CM

## 2023-01-25 PROCEDURE — 99204 OFFICE O/P NEW MOD 45 MIN: CPT

## 2023-01-25 PROCEDURE — 80305 DRUG TEST PRSMV DIR OPT OBS: CPT | Mod: QW

## 2023-01-25 PROCEDURE — 99406 BEHAV CHNG SMOKING 3-10 MIN: CPT

## 2023-01-25 NOTE — ASSESSMENT
[FreeTextEntry1] : Ms. Roro Funes is a 62 year old female with a chief complaint of lower back and bilateral knee pain. She notes a history of osteonecrosis in bilateral knees. Patient has undergone synvisc injection sin bilateral knees with no relief. She is planning to see Dr. Camejo in orthopedics in the future for her bilateral knee pain. We will order a bilateral knee x-ray. Will order a bilateral 2 view x-ray due to pain and decrease in range of motion in that area to delineate a pain generator. \par \par In regard to her lower back pain, she had a lumbar x-ray which was reviewed in detail. She is scheduled to undergo a lumbar MRI on Sunday. Depending on MRI results we would consider injections treatment.  Patient has pain in spinal movement along with weakness in extension and flexion. I will put in for a lumbar brace with lateral supports, which is medically necessary to be worn no more than 4 hours a day and 2 hours in a row to decrease facet and disc load, to decrease pain, increase activity, increase function, to reduce pain by restricting mobility of the trunk, to facilitate healing of the spine and related Soft tissues and to support weak spinal muscles used to help the patient with rehab and home exercise program stressing walking.  Other exercises discussed include swimming, elliptical , recumbent bike, Abhijeet chi and Yoga. Use things that heat like hot shower or icy heat before rehab and exercising and at the beginning of the day, and ice (ice in a bag never directly on the skin) after activity and at the end of the day. \par \par In the past, she has managed her pain medically with medical marijuana which provided her with excellent relief. She did use narcotics for pain in the past, however she has successfully weened of of the medication with the use of Suboxone. \par \par We would like to trial medical marijuana. We will start with a sublingual oil/pill with high THC to low CBD ratio. We will give the patient the option to decrease to a 1:1 ratio if the patient reports drowsiness. We will allow the patient to use 1-2 vapes during the day or at night. \par \par I have consulted the  registry for the purpose of reviewing the patients controlled substance\par \par  UDS to be completed today.\par \par We have spent approximately 5-10 minutes discussing the importance of smoking cessation and suggested a follow up with a primary care doctor to see if the doctor can aid in those endeavors.  \par \par I, Philomena Sen, attest that this documentation has been prepared under the direction and in the presence of Provider Oziel Flores DO\par The documentation recorded by the scribe, in my presence, accurately reflects the service I personally performed, and the decisions made by me with my edits as appropriate.\par \par Best Regards, \par Oziel Flores D.O. \par Diplomat, American Board of Anesthesiology \par Diplomat, American Board of Pain Medicine \par Diplomat, American Board of Pain Management

## 2023-01-25 NOTE — HISTORY OF PRESENT ILLNESS
[FreeTextEntry1] : HISTORY OF PRESENT ILLNESS: This is a 62 year old woman complaining of lower back and bilateral knee pain. The patient has had this pain for over 20 years. The pain has worsened in the last year and half. The pain started after no specific injury or event. Patient describes pain as moderate to severe. During the last month the pain has been nearly constant with symptoms worse in the evening. Pain described as sharp, pressure-like, and cutting. Pain is not associated with numbness or weakness.  Pain is increased with lying down.  Pain is decreased with standing, sitting, walking, relaxation, and coughing/sneezing. Pain is not changed with exercise, coughing/sneezing, and bowel movements. Bowel or bladder habits have not changed.\par  \par ACTIVITIES: Patient could walk 3 blocks before the pain starts. Patient can sit 10-15 minutes  before pain starts. Patient can stand 30 minutes before pain starts. The patient often lays down because of pain. Patient uses a cane at this time. Patient has difficulty performing household chores, participating in recreational activities, and exercising at this time.\par \par PRIOR PAIN TREATMENTS:  Excellent relief with traction, physical therapy, heat treatment and cold treatment\par \par PRIOR PAIN MEDICATIONS:  Soma, Flexeril, naproxen, Toradol, Valium and Wellbutrin\par \par Covid 19 - This in-office encounter has occurred during a Public Health Emergency (PHE). As required by law, due to the risk of respiratory-transmitted infectious diseases, our office provided additional materials, supplies and clinical staff to assist in keeping our patients, physicians and office staff safe during this health emergency.\par

## 2023-01-25 NOTE — PHYSICAL EXAM
[de-identified] : GENERAL APPEARANCE OF PATIENT IS WELL DEVELOPED, WELL NOURISHED, BODY HABITUS NORMAL, WELL GROOMED, NO DEFORMITIES NOTED. \par Head - Atraumatic and Normocephalic \par Eyes, Nose, and Throat: External inspection of ears and nose are normal overall without scars, lesions, or masses noted. Assessment of hearing is normal\par Neck-Examination of neck shows no masses, overall appearance is normal, neck is symmetric, tracheal position is midline, no crepitus is noted. Examination of thyroid shows no enlargement, tenderness or masses\par Respiratory- Assessment of respiratory effort shows no intercostal retractions, no use of accessory muscles, unlabored breathing, and normal diaphragmatic movement.\par Cardiovascular- Examination of extremities show no edema or varicosities\par Musculoskeletal. Examination of gait is not antalgic and station is normal\par Inspection and palpation of digits and nails shows no clubbing, cyanosis, nodules, drainage, fluctuance, petechiae\par \par • Spine – cannot perform Ruddy's test on right. 100 degrees in flexion with no pain. 0-5 degrees in extension Sciatic noth tenderness on right. GTB tenderness on right. \par \par X-ray from 1/18/23 read by Dr. Flores on 1/25/23: lumbar scoliosis with a concavity on the right side. Severe lumbar degenerative disc at L5-S1 and L4-5. \par \par \par • Neck- some restriction in extension, 10-15 degrees with no pain. \par \par • RUE- inspection and palpation shows no misalignment, asymmetry, crepitation, defects, tenderness, masses, effusions. ROM is normal without crepitation or contracture. No instability or subluxation or laxity is noted. No abnormal movements.\par \par \par • LUE- inspection and palpation shows no misalignment, asymmetry, crepitation, defects, tenderness, masses, effusions. ROM is normal without crepitation or contracture. No instability or subluxation or laxity is noted. No abnormal movements.\par \par \par • RLE- pain on top of the patellar and on the lateral aspect of her right knee. Mild crepitus. \par \par \par • LLE- inspection and palpation shows no misalignment, asymmetry, crepitation, defects, tenderness, masses, effusions. ROM is normal without crepitation or contracture. No instability or subluxation or laxity is noted. No abnormal movements.\par \par \par • Skin- Inspection of skin and subcutaneous tissue shows no rashes, lesions or ulcers. Palpation of skin and subcutaneous tissue shows no rashes, no indurations, subcutaneous nodules or tightening.\par \par \par • Abdomen- Nontender\par \par \par • Neurologic- CN 2-12 are grossly intact. No sensory or motor deficits in the upper and lower extremities. Adequate strength in upper and lower extremities \par \par \par • Psychiatric- Patient’s judgment and insight are intact. Oriented to time, place and person. Recent and remote memory intact.\par

## 2023-01-26 LAB — PM AMPHETAMINE: NORMAL

## 2023-02-03 ENCOUNTER — APPOINTMENT (OUTPATIENT)
Dept: RADIOLOGY | Facility: CLINIC | Age: 63
End: 2023-02-03
Payer: MEDICAID

## 2023-02-03 ENCOUNTER — RESULT CHARGE (OUTPATIENT)
Age: 63
End: 2023-02-03

## 2023-02-03 PROCEDURE — 73564 X-RAY EXAM KNEE 4 OR MORE: CPT | Mod: 50

## 2023-02-23 ENCOUNTER — APPOINTMENT (OUTPATIENT)
Dept: ORTHOPEDIC SURGERY | Facility: CLINIC | Age: 63
End: 2023-02-23
Payer: MEDICAID

## 2023-02-23 DIAGNOSIS — M17.0 BILATERAL PRIMARY OSTEOARTHRITIS OF KNEE: ICD-10-CM

## 2023-02-23 PROCEDURE — 99213 OFFICE O/P EST LOW 20 MIN: CPT

## 2023-02-23 NOTE — HISTORY OF PRESENT ILLNESS
[de-identified] : Patient here for evaluation bilateral knee pain.\par Denies instability\par Pain with ambulation and stairs\par \par only able to walk 2-3 blocks\par \par NAD\par bilateral knee\par No skin breakdown\par Tricompartmental TTP\par Positive patella grind\par PF crepitus\par Negative lachman\par Negative varus/valgus instability\par ROM 0-110\par Pain with forced extension and flexion\par NVI\par Compartments soft and NT\par \par Xray reviewed and significant for bilateral knee arthritis, possible osteonecrosis\par \par Plan\par went over findings\par explained the xrays\par she is interested in discussing tka for her knees, will refer to F\par

## 2023-03-01 ENCOUNTER — APPOINTMENT (OUTPATIENT)
Dept: PAIN MANAGEMENT | Facility: CLINIC | Age: 63
End: 2023-03-01
Payer: MEDICAID

## 2023-03-01 VITALS — HEIGHT: 64 IN | WEIGHT: 140 LBS | BODY MASS INDEX: 23.9 KG/M2

## 2023-03-01 DIAGNOSIS — M17.11 UNILATERAL PRIMARY OSTEOARTHRITIS, RIGHT KNEE: ICD-10-CM

## 2023-03-01 PROCEDURE — 99214 OFFICE O/P EST MOD 30 MIN: CPT

## 2023-03-01 NOTE — ASSESSMENT
[FreeTextEntry1] : Ms. Roro Funes is a 62 year old female with a chief complaint of lower back and bilateral knee pain. She notes a history of osteonecrosis in bilateral knees. Patient has undergone synvisc injection sin bilateral knees with no relief. She notes pain remains unchanged in severity and distribution since her last encounter. She had a right knee x-ray which was reviewed in detail during today's encounter. She notes that she saw Dr. Camejo who referred her to Dr. White to discuss a possible surgical option for her knee pain. \par \par At this time she would like to trial physical therapy for her bilateral knee pain. Physical therapy of the bilateral 2-3 times a week for 4-6 weeks stressing a home exercise program of walking, shoulder griddle strengthening,  swimming, elliptical , recumbent bike, Abhijeet chi and Yoga. Use things that heat like hot shower or icy heat before rehab, exercising and at the beginning of the day, and ice (ice in a bag never directly on the skin) after activity and at the end of the day.\par \par We will also write her a prescription for a lumbar scoliosis brace as well as a right knee brace. \par \par In the past, she has managed her pain medically with medical marijuana which provided her with excellent relief. She did use narcotics for pain in the past, however she has successfully weened of of the medication with the use of Suboxone. She is no longer using suboxone. \par \par Patient was managing her pain medically with medical marijuana however she notes it does not provide her with sustained relief throughout the day. She will discontinue the use of medical marijuana. Her prescription was cancelled today. At this time we will trial Tramadol 50 mg BID for her pain. \par \par I have consulted the  registry for the purpose of reviewing the patients controlled substance\par \par  UDS completed on 1/25/23 - consistent \par \par We have spent approximately 5-10 minutes discussing the importance of smoking cessation and suggested a follow up with a primary care doctor to see if the doctor can aid in those endeavors.  \par \par I, Philomena Sen, attest that this documentation has been prepared under the direction and in the presence of Provider ANN-MARIE Foxpar The documentation recorded by the scribe, in my presence, accurately reflects the service I personally performed, and the decisions made by me with my edits as appropriate.\par \par Best Regards, \par FELICIA Saeed.[email or web address]. \par Diplomat, American Board of Anesthesiology \par Diplomat, American Board of Pain Medicine \par Diplomat, American Board of Pain Management

## 2023-03-01 NOTE — HISTORY OF PRESENT ILLNESS
[FreeTextEntry1] : HISTORY OF PRESENT ILLNESS: This is a 62 year old woman complaining of lower back and bilateral knee pain. The patient has had this pain for over 20 years. The pain has worsened in the last year and half. The pain started after no specific injury or event. Patient describes pain as moderate to severe. During the last month the pain has been nearly constant with symptoms worse in the evening. Pain described as sharp, pressure-like, and cutting. Pain is not associated with numbness or weakness.  Pain is increased with lying down.  Pain is decreased with standing, sitting, walking, relaxation, and coughing/sneezing. Pain is not changed with exercise, coughing/sneezing, and bowel movements. Bowel or bladder habits have not changed.\par  \par ACTIVITIES: Patient could walk 3 blocks before the pain starts. Patient can sit 10-15 minutes  before pain starts. Patient can stand 30 minutes before pain starts. The patient often lays down because of pain. Patient uses a cane at this time. Patient has difficulty performing household chores, participating in recreational activities, and exercising at this time.\par \par PRIOR PAIN TREATMENTS:  Excellent relief with traction, physical therapy, heat treatment and cold treatment\par \par PRIOR PAIN MEDICATIONS:  Soma, Flexeril, naproxen, Toradol, Valium and Wellbutrin\par \par PRESENTING TODAY: In revisit encounter in regard to her continued right knee pain. She notes pain remains unchanged in severity and distribution since her last encounter. She had a right knee x-ray which was reviewed in detail during today's encounter. She notes that she saw Dr. Camejo who referred her to Dr. White to discuss a possible surgical option for her knee pain. \par \par Covid 19 - This in-office encounter has occurred during a Public Health Emergency (PHE). As required by law, due to the risk of respiratory-transmitted infectious diseases, our office provided additional materials, supplies and clinical staff to assist in keeping our patients, physicians and office staff safe during this health emergency.\par

## 2023-03-01 NOTE — PHYSICAL EXAM
[de-identified] : GENERAL APPEARANCE OF PATIENT IS WELL DEVELOPED, WELL NOURISHED, BODY HABITUS NORMAL, WELL GROOMED, NO DEFORMITIES NOTED. \par Head - Atraumatic and Normocephalic \par Eyes, Nose, and Throat: External inspection of ears and nose are normal overall without scars, lesions, or masses noted. Assessment of hearing is normal\par Neck-Examination of neck shows no masses, overall appearance is normal, neck is symmetric, tracheal position is midline, no crepitus is noted. Examination of thyroid shows no enlargement, tenderness or masses\par Respiratory- Assessment of respiratory effort shows no intercostal retractions, no use of accessory muscles, unlabored breathing, and normal diaphragmatic movement.\par Cardiovascular- Examination of extremities show no edema or varicosities\par Musculoskeletal. Examination of gait is not antalgic and station is normal\par Inspection and palpation of digits and nails shows no clubbing, cyanosis, nodules, drainage, fluctuance, petechiae\par \par • Spine – cannot perform Ruddy's test on right. 100 degrees in flexion with no pain. 0-5 degrees in extension Sciatic noth tenderness on right. GTB tenderness on right. \par \par X-ray from 1/18/23 read by Dr. Flores on 1/25/23: lumbar scoliosis with a concavity on the right side. Severe lumbar degenerative disc at L5-S1 and L4-5. \par \par \par • Neck- some restriction in extension, 10-15 degrees with no pain. \par \par • RUE- inspection and palpation shows no misalignment, asymmetry, crepitation, defects, tenderness, masses, effusions. ROM is normal without crepitation or contracture. No instability or subluxation or laxity is noted. No abnormal movements.\par \par \par • LUE- inspection and palpation shows no misalignment, asymmetry, crepitation, defects, tenderness, masses, effusions. ROM is normal without crepitation or contracture. No instability or subluxation or laxity is noted. No abnormal movements.\par \par \par • RLE- pain on top of the patellar and on the lateral aspect of her right knee. Mild crepitus. \par \par \par • LLE- inspection and palpation shows no misalignment, asymmetry, crepitation, defects, tenderness, masses, effusions. ROM is normal without crepitation or contracture. No instability or subluxation or laxity is noted. No abnormal movements.\par \par \par • Skin- Inspection of skin and subcutaneous tissue shows no rashes, lesions or ulcers. Palpation of skin and subcutaneous tissue shows no rashes, no indurations, subcutaneous nodules or tightening.\par \par \par • Abdomen- Nontender\par \par \par • Neurologic- CN 2-12 are grossly intact. No sensory or motor deficits in the upper and lower extremities. Adequate strength in upper and lower extremities \par \par \par • Psychiatric- Patient’s judgment and insight are intact. Oriented to time, place and person. Recent and remote memory intact.\par

## 2023-03-31 ENCOUNTER — APPOINTMENT (OUTPATIENT)
Dept: ORTHOPEDIC SURGERY | Facility: CLINIC | Age: 63
End: 2023-03-31

## 2023-04-05 ENCOUNTER — APPOINTMENT (OUTPATIENT)
Dept: PAIN MANAGEMENT | Facility: CLINIC | Age: 63
End: 2023-04-05

## 2023-04-11 ENCOUNTER — APPOINTMENT (OUTPATIENT)
Dept: PAIN MANAGEMENT | Facility: CLINIC | Age: 63
End: 2023-04-11
Payer: MEDICAID

## 2023-04-11 VITALS — HEIGHT: 64 IN | BODY MASS INDEX: 23.9 KG/M2 | WEIGHT: 140 LBS

## 2023-04-11 DIAGNOSIS — G89.29 OTHER CHRONIC PAIN: ICD-10-CM

## 2023-04-11 DIAGNOSIS — M51.26 OTHER INTERVERTEBRAL DISC DISPLACEMENT, LUMBAR REGION: ICD-10-CM

## 2023-04-11 PROCEDURE — 99212 OFFICE O/P EST SF 10 MIN: CPT

## 2023-04-11 RX ORDER — CYCLOBENZAPRINE HYDROCHLORIDE 5 MG/1
5 TABLET, FILM COATED ORAL 3 TIMES DAILY
Qty: 15 | Refills: 0 | Status: DISCONTINUED | COMMUNITY
Start: 2023-01-18 | End: 2023-04-11

## 2023-04-11 RX ORDER — TRAMADOL HYDROCHLORIDE 50 MG/1
50 TABLET, COATED ORAL TWICE DAILY
Qty: 60 | Refills: 0 | Status: DISCONTINUED | COMMUNITY
Start: 2023-03-01 | End: 2023-04-11

## 2023-04-11 NOTE — ASSESSMENT
[FreeTextEntry1] : Ms. Roro Funes is a 63  year old female with a chief complaint of lower back and bilateral knee pain. She notes a history of osteonecrosis in bilateral knees. Patient has undergone Synvisc injections in bilateral knees with no relief. She notes pain remains unchanged in severity and distribution since her last encounter. She is yet to be seen by .\par \par She is yet to start Physical Therapy. We will no longer prescribe Tramadol or any other Opioids due to the fact that she was restarted on Suboxone. She will continue manage her pain with Cyclobenzaprine and Medical Marijuana.\par \par I have consulted the  registry for the purpose of reviewing the patients controlled substance\par Patient is to return for f/u on PRN basis.\par \par Fausto Renee PA-C \par Oziel Flores D.O. \par

## 2023-04-11 NOTE — HISTORY OF PRESENT ILLNESS
[FreeTextEntry1] : HISTORY OF PRESENT ILLNESS: This is a 63 year old woman complaining of lower back and bilateral knee pain. The patient has had this pain for over 20 years. The pain has worsened in the last year and half. The pain started after no specific injury or event. Patient describes pain as moderate to severe. During the last month the pain has been nearly constant with symptoms worse in the evening. Pain described as sharp, pressure-like, and cutting. Pain is not associated with numbness or weakness.  Pain is increased with lying down.  Pain is decreased with standing, sitting, walking, relaxation, and coughing/sneezing. Pain is not changed with exercise, coughing/sneezing, and bowel movements. Bowel or bladder habits have not changed.\par  \par ACTIVITIES: Patient could walk 3 blocks before the pain starts. Patient can sit 10-15 minutes  before pain starts. Patient can stand 30 minutes before pain starts. The patient often lays down because of pain. Patient uses a cane at this time. Patient has difficulty performing household chores, participating in recreational activities, and exercising at this time.\par \par PRIOR PAIN TREATMENTS:  Excellent relief with traction, physical therapy, heat treatment and cold treatment\par \par PRIOR PAIN MEDICATIONS:  Soma, Flexeril, naproxen, Toradol, Valium and Wellbutrin\par \par PRESENTING TODAY:Last seen on 03/01/2023 and since then there has been no new complaints or acute changes. \par In regards to her right knee pain she is yet to see Dr. Luna.She notes pain remains unchanged in severity and distribution since her last encounter. \par In regartds to her lower back pain she is yet to start Physical Therapy.\par She reports estephania she takes Cyclobenzaprine, which helps with her lower back. She started and then discontinued Tramadol reporting that she didn't like how it made her feel. She went back on Suboxone. She also reports that she is still using Medical Marijuana and that it works best for her pain.\par

## 2023-04-11 NOTE — PHYSICAL EXAM
[] : diminished ROM in all planes [Flexion] : flexion [de-identified] : GENERAL APPEARANCE OF PATIENT IS WELL DEVELOPED, WELL NOURISHED, BODY HABITUS NORMAL, WELL GROOMED, NO DEFORMITIES NOTED. \par Head - Atraumatic and Normocephalic \par Eyes, Nose, and Throat: External inspection of ears and nose are normal overall without scars, lesions, or masses noted. Assessment of hearing is normal\par Neck-Examination of neck shows no masses, overall appearance is normal, neck is symmetric, tracheal position is midline, no crepitus is noted. Examination of thyroid shows no enlargement, tenderness or masses\par Respiratory- Assessment of respiratory effort shows no intercostal retractions, no use of accessory muscles, unlabored breathing, and normal diaphragmatic movement.\par Cardiovascular- Examination of extremities show no edema or varicosities\par Musculoskeletal. Examination of gait is not antalgic and station is normal\par Inspection and palpation of digits and nails shows no clubbing, cyanosis, nodules, drainage, fluctuance, petechiae\par \par • Spine – cannot perform Ruddy's test on right. 100 degrees in flexion with no pain. 0-5 degrees in extension Sciatic noth tenderness on right. GTB tenderness on right. \par \par X-ray from 1/18/23 read by Dr. Flores on 1/25/23: lumbar scoliosis with a concavity on the right side. Severe lumbar degenerative disc at L5-S1 and L4-5. \par \par \par • Neck- some restriction in extension, 10-15 degrees with no pain. \par \par • RUE- inspection and palpation shows no misalignment, asymmetry, crepitation, defects, tenderness, masses, effusions. ROM is normal without crepitation or contracture. No instability or subluxation or laxity is noted. No abnormal movements.\par \par \par • LUE- inspection and palpation shows no misalignment, asymmetry, crepitation, defects, tenderness, masses, effusions. ROM is normal without crepitation or contracture. No instability or subluxation or laxity is noted. No abnormal movements.\par \par \par • RLE- pain on top of the patellar and on the lateral aspect of her right knee. Mild crepitus. \par \par \par • LLE- inspection and palpation shows no misalignment, asymmetry, crepitation, defects, tenderness, masses, effusions. ROM is normal without crepitation or contracture. No instability or subluxation or laxity is noted. No abnormal movements.\par \par \par • Skin- Inspection of skin and subcutaneous tissue shows no rashes, lesions or ulcers. Palpation of skin and subcutaneous tissue shows no rashes, no indurations, subcutaneous nodules or tightening.\par \par \par • Abdomen- Nontender\par \par \par • Neurologic- CN 2-12 are grossly intact. No sensory or motor deficits in the upper and lower extremities. Adequate strength in upper and lower extremities \par \par \par • Psychiatric- Patient’s judgment and insight are intact. Oriented to time, place and person. Recent and remote memory intact.\par

## 2023-04-17 NOTE — ED PROCEDURE NOTE - CPROC ED TOLERANCE1
Vicente Coombs  [address] [place] [address]    Your procedure is on 5/2/23 Anticipated Arrival Time 7:00 AM  Location: [facility] [address] [place]  Procedure Name: Radiofrequency Ablation Medial Branch, Right, Cervical, C4, C5, C6      We understand that you are looking forward to pain relief, yet your safety and comfort is important to us during your procedure.  Your care team will assess you the day of your procedure.     If any of the below occur on the day of the procedure, you may be asked to reschedule:   • low or high blood pressure   • low or high blood sugar  • irregular heartbeat   • recent or current infection   • not holding certain medications as instructed  • not fasting (eating or drinking) for procedures that you are getting sedation for  • any other medical reason which your care team feels it is best to delay your procedure    The time listed above is an estimated time. Our pre-procedural department will call you 1-2 days prior to your procedure to confirm your arrival time.  Please note- if your procedure is scheduled near the end of the day, you may be asked to move your procedure to an earlier time due to changes in the schedule and/or possibly reschedule to a different date.    If you are receiving sedation (something to help you relax) or having an epidural steroid injection, you must have a responsible adult  to take you home after your procedure. If you do not have a responsible adult , your procedure may be cancelled. If you choose to take the bus, cab, Uber, etc. and do not have a responsible adult to accompany you, your procedure may be cancelled.     Please notify the office immediately if:  • You develop an infection of any kind  • You have a fever within 7 days of your procedure  • You start any new medications since you were seen in the office by the pain management provider, including antibiotics,   • Received any dose of the  COVID vaccine in the past two weeks or planning on getting a dosage within the next 2 weeks   • No longer have any pain and wish to cancel the procedure    According to [facility]’s cancellation policy, if you need to change a scheduled appointment, you must call the Pain Management Department  at least 48 hours before your appointment to cancel or reschedule.     Vicente is scheduled with local anesthetic. Patient is educated that it is not necessary to withhold from eating or drinking the day of their procedure.    One or more of the medications from your home medication list have antiplatelet or anticoagulant (blood thinning) properties.  Due to the risk for bleeding associated with your procedure the following medications must be stopped for the number of days indicated before your procedure:    Medication Name and Days to Hold:  Aspirin 81 mg (6 day hold)  Clopidogrel (Plavix) (consulting with prescribing provider) 7 day hold    Do you have any history of bleeding or clotting disorders? No  Do you have any of the following? NA    Please contact your insurance to verify benefits/coverage. If you have any insurance changes prior to your scheduled procedure, please contact our office.     Please note that an authorization/pre-approval obtained by our team is not a guarantee of payment. Any additional financial or billing questions, you can call our Patient Contact Center at [phone].    If you have any questions regarding these instructions, please call [facility] at [phone].        Patient tolerated procedure well.

## 2023-05-07 NOTE — ED ADULT NURSE NOTE - EENT WDL
normal... Eyes with no visual disturbances.  Ears clean and dry and no hearing difficulties. Nose with pink mucosa and no drainage.  Mouth mucous membranes moist and pink.  No tenderness or swelling to throat or neck.

## 2023-05-31 DIAGNOSIS — M51.36 OTHER INTERVERTEBRAL DISC DEGENERATION, LUMBAR REGION: ICD-10-CM

## 2023-05-31 RX ORDER — CYCLOBENZAPRINE HYDROCHLORIDE 5 MG/1
5 TABLET, FILM COATED ORAL 3 TIMES DAILY
Qty: 30 | Refills: 0 | Status: ACTIVE | COMMUNITY
Start: 2023-05-31 | End: 1900-01-01

## 2023-06-16 ENCOUNTER — APPOINTMENT (OUTPATIENT)
Dept: ORTHOPEDIC SURGERY | Facility: CLINIC | Age: 63
End: 2023-06-16

## 2023-06-16 ENCOUNTER — APPOINTMENT (OUTPATIENT)
Dept: ORTHOPEDIC SURGERY | Facility: CLINIC | Age: 63
End: 2023-06-16
Payer: MEDICAID

## 2023-06-16 PROCEDURE — 99213 OFFICE O/P EST LOW 20 MIN: CPT

## 2023-06-16 NOTE — HISTORY OF PRESENT ILLNESS
[de-identified] : eval of b/l knees\par has osteonecrosis in both knees\par pain controlled\par mild pain with deep flexion\par x-rays compared with prior and findings appear stable\par continue nonop mngt\par f/u as needed

## 2023-06-30 RX ORDER — CHLORZOXAZONE 250 MG/1
250 TABLET ORAL 3 TIMES DAILY
Qty: 90 | Refills: 0 | Status: ACTIVE | COMMUNITY
Start: 2023-06-30 | End: 1900-01-01

## 2023-07-03 RX ORDER — METHOCARBAMOL 750 MG/1
750 TABLET, FILM COATED ORAL
Qty: 90 | Refills: 1 | Status: ACTIVE | COMMUNITY
Start: 2023-07-03 | End: 1900-01-01

## 2023-09-08 NOTE — ED ADULT NURSE NOTE - CAS TRG GEN SKIN CONDITION
Patient updated on Ms Maldonado' comments and verbalized understanding. BMP added to previously scheduled labs on 10/6 and agreed to date/time of appointment(s).   Warm

## 2023-09-15 ENCOUNTER — OUTPATIENT (OUTPATIENT)
Dept: OUTPATIENT SERVICES | Facility: HOSPITAL | Age: 63
LOS: 1 days | End: 2023-09-15
Payer: COMMERCIAL

## 2023-09-15 DIAGNOSIS — Z01.20 ENCOUNTER FOR DENTAL EXAMINATION AND CLEANING WITHOUT ABNORMAL FINDINGS: ICD-10-CM

## 2023-09-15 DIAGNOSIS — Z01.21 ENCOUNTER FOR DENTAL EXAMINATION AND CLEANING WITH ABNORMAL FINDINGS: ICD-10-CM

## 2023-09-15 DIAGNOSIS — Z90.710 ACQUIRED ABSENCE OF BOTH CERVIX AND UTERUS: Chronic | ICD-10-CM

## 2023-09-15 DIAGNOSIS — Z98.84 BARIATRIC SURGERY STATUS: Chronic | ICD-10-CM

## 2023-09-15 DIAGNOSIS — Z90.89 ACQUIRED ABSENCE OF OTHER ORGANS: Chronic | ICD-10-CM

## 2023-09-15 PROCEDURE — D0230: CPT

## 2023-09-15 PROCEDURE — D0150: CPT

## 2023-10-30 RX ORDER — CYCLOBENZAPRINE HYDROCHLORIDE 10 MG/1
10 TABLET, FILM COATED ORAL EVERY 8 HOURS
Qty: 90 | Refills: 6 | Status: ACTIVE | COMMUNITY
Start: 2023-01-25 | End: 1900-01-01

## 2023-11-13 ENCOUNTER — APPOINTMENT (OUTPATIENT)
Dept: PAIN MANAGEMENT | Facility: CLINIC | Age: 63
End: 2023-11-13

## 2023-11-15 ENCOUNTER — APPOINTMENT (OUTPATIENT)
Dept: ORTHOPEDIC SURGERY | Facility: CLINIC | Age: 63
End: 2023-11-15

## 2023-11-28 ENCOUNTER — APPOINTMENT (OUTPATIENT)
Dept: ORTHOPEDIC SURGERY | Facility: CLINIC | Age: 63
End: 2023-11-28

## 2023-12-20 NOTE — SWALLOW BEDSIDE ASSESSMENT ADULT - ASR SWALLOW DENTITION
Health Call Center    Phone Message    May a detailed message be left on voicemail: yes     Reason for Call: Other: Katherine with [facility] called wanting to speak with Jimmie to get clarifications on lab orders. Katherine stated there are orders for patient to have BMP lab done in 2 weeks and a month after, wanting to know if these labs will be done at the clinic or not since they can also do the labs there with a fee. Please call Katherine back at [phone] to further discuss.     Action Taken: Other: Cardiology    Travel Screening: Not Applicable    Thank you!  [facility]                                    Called and spoke with Katherine. Patient is already scheduled for the lab appointments at the [facility].                                  
present and adequate

## 2024-05-09 NOTE — ED ADULT TRIAGE NOTE - WEIGHT IN LBS
agree with above      Dr. Marilyn Hernandez  Rheumatology Attending  [facility]  Rheumatology at [place]     Contact:   call the office:: [phone] or reach [place] [employer] Teams, weekdays before 5 pm   after 5 pm or on weekends, contact [phone] Amendments to fellow's assessment and plan as above.  Patient aware of our recommendations and in agreement.  Discussed with primary team  will follow Amendments to fellow's assessment and plan as above.  Patient aware of our recommendations and in agreement.  Discussed with primary team  will follow as above      Dr. Marilyn Hernandez  Rheumatology Attending  [facility]  Rheumatology at [place]     Contact:   call the office:: [phone] or reach va [employer] Teams, weekdays before 5 pm   after 5 pm or on weekends, contact [phone] Agree with fellow's assessment and plan as above.  Patient and wife (via phone) aware of our recommendations and in agreement.  Discussed with primary team  will follow 154.9

## 2024-07-10 ENCOUNTER — RX RENEWAL (OUTPATIENT)
Age: 64
End: 2024-07-10

## 2024-12-18 NOTE — STROKE CODE NOTE - NIH STROKE SCALE: 1C. LOC COMMANDS, QM
Patient stated she was having contractions prior. Placed on NST for an hour. NST was reviewed by MD and was okay to take off the monitor.    (0) Performs both tasks correctly

## 2025-02-25 ENCOUNTER — APPOINTMENT (OUTPATIENT)
Dept: CARDIOTHORACIC SURGERY | Facility: CLINIC | Age: 65
End: 2025-02-25
Payer: MEDICAID

## 2025-02-25 VITALS
BODY MASS INDEX: 28.17 KG/M2 | SYSTOLIC BLOOD PRESSURE: 119 MMHG | RESPIRATION RATE: 16 BRPM | WEIGHT: 165 LBS | DIASTOLIC BLOOD PRESSURE: 65 MMHG | OXYGEN SATURATION: 97 % | TEMPERATURE: 98.7 F | HEIGHT: 64 IN | HEART RATE: 83 BPM

## 2025-02-25 DIAGNOSIS — R91.8 OTHER NONSPECIFIC ABNORMAL FINDING OF LUNG FIELD: ICD-10-CM

## 2025-02-25 PROCEDURE — 99245 OFF/OP CONSLTJ NEW/EST HI 55: CPT

## 2025-02-25 RX ORDER — PRASTERONE (DHEA) 50 MG
CAPSULE ORAL
Refills: 0 | Status: ACTIVE | COMMUNITY

## 2025-02-25 RX ORDER — TRAZODONE HYDROCHLORIDE 50 MG/1
50 TABLET ORAL
Refills: 0 | Status: ACTIVE | COMMUNITY

## 2025-02-25 RX ORDER — VITAMIN B COMPLEX
CAPSULE ORAL
Refills: 0 | Status: ACTIVE | COMMUNITY

## 2025-02-25 RX ORDER — BUPROPION HYDROCHLORIDE 150 MG/1
150 TABLET, EXTENDED RELEASE ORAL DAILY
Refills: 0 | Status: ACTIVE | COMMUNITY

## 2025-02-25 RX ORDER — CLONAZEPAM 1 MG/1
1 TABLET ORAL
Refills: 0 | Status: ACTIVE | COMMUNITY

## 2025-03-06 ENCOUNTER — RESULT REVIEW (OUTPATIENT)
Age: 65
End: 2025-03-06

## 2025-03-06 ENCOUNTER — NON-APPOINTMENT (OUTPATIENT)
Age: 65
End: 2025-03-06

## 2025-03-06 ENCOUNTER — OUTPATIENT (OUTPATIENT)
Dept: OUTPATIENT SERVICES | Facility: HOSPITAL | Age: 65
LOS: 1 days | End: 2025-03-06
Payer: MEDICAID

## 2025-03-06 VITALS
HEIGHT: 64 IN | OXYGEN SATURATION: 98 % | WEIGHT: 179.9 LBS | SYSTOLIC BLOOD PRESSURE: 122 MMHG | DIASTOLIC BLOOD PRESSURE: 86 MMHG | TEMPERATURE: 98 F | RESPIRATION RATE: 18 BRPM | HEART RATE: 100 BPM

## 2025-03-06 DIAGNOSIS — Z90.710 ACQUIRED ABSENCE OF BOTH CERVIX AND UTERUS: Chronic | ICD-10-CM

## 2025-03-06 DIAGNOSIS — R91.1 SOLITARY PULMONARY NODULE: ICD-10-CM

## 2025-03-06 DIAGNOSIS — Z90.89 ACQUIRED ABSENCE OF OTHER ORGANS: Chronic | ICD-10-CM

## 2025-03-06 DIAGNOSIS — Z96.641 PRESENCE OF RIGHT ARTIFICIAL HIP JOINT: Chronic | ICD-10-CM

## 2025-03-06 DIAGNOSIS — Z01.818 ENCOUNTER FOR OTHER PREPROCEDURAL EXAMINATION: ICD-10-CM

## 2025-03-06 DIAGNOSIS — Z98.84 BARIATRIC SURGERY STATUS: Chronic | ICD-10-CM

## 2025-03-06 PROBLEM — R73.03 PREDIABETES: Chronic | Status: INACTIVE | Noted: 2019-05-17 | Resolved: 2025-03-06

## 2025-03-06 LAB
ALBUMIN SERPL ELPH-MCNC: 4.3 G/DL — SIGNIFICANT CHANGE UP (ref 3.5–5.2)
ALP SERPL-CCNC: 110 U/L — SIGNIFICANT CHANGE UP (ref 30–115)
ALT FLD-CCNC: 13 U/L — SIGNIFICANT CHANGE UP (ref 0–41)
ANION GAP SERPL CALC-SCNC: 8 MMOL/L — SIGNIFICANT CHANGE UP (ref 7–14)
APTT BLD: 32.8 SEC — SIGNIFICANT CHANGE UP (ref 27–39.2)
AST SERPL-CCNC: 17 U/L — SIGNIFICANT CHANGE UP (ref 0–41)
BASOPHILS # BLD AUTO: 0.04 K/UL — SIGNIFICANT CHANGE UP (ref 0–0.2)
BASOPHILS NFR BLD AUTO: 0.8 % — SIGNIFICANT CHANGE UP (ref 0–1)
BILIRUB SERPL-MCNC: <0.2 MG/DL — SIGNIFICANT CHANGE UP (ref 0.2–1.2)
BLD GP AB SCN SERPL QL: SIGNIFICANT CHANGE UP
BUN SERPL-MCNC: 13 MG/DL — SIGNIFICANT CHANGE UP (ref 10–20)
CALCIUM SERPL-MCNC: 9.8 MG/DL — SIGNIFICANT CHANGE UP (ref 8.4–10.5)
CHLORIDE SERPL-SCNC: 106 MMOL/L — SIGNIFICANT CHANGE UP (ref 98–110)
CO2 SERPL-SCNC: 29 MMOL/L — SIGNIFICANT CHANGE UP (ref 17–32)
CREAT SERPL-MCNC: 0.8 MG/DL — SIGNIFICANT CHANGE UP (ref 0.7–1.5)
EGFR: 82 ML/MIN/1.73M2 — SIGNIFICANT CHANGE UP
EGFR: 82 ML/MIN/1.73M2 — SIGNIFICANT CHANGE UP
EOSINOPHIL # BLD AUTO: 0.07 K/UL — SIGNIFICANT CHANGE UP (ref 0–0.7)
EOSINOPHIL NFR BLD AUTO: 1.3 % — SIGNIFICANT CHANGE UP (ref 0–8)
GLUCOSE SERPL-MCNC: 81 MG/DL — SIGNIFICANT CHANGE UP (ref 70–99)
HCT VFR BLD CALC: 43.7 % — SIGNIFICANT CHANGE UP (ref 37–47)
HGB BLD-MCNC: 13.5 G/DL — SIGNIFICANT CHANGE UP (ref 12–16)
IMM GRANULOCYTES NFR BLD AUTO: 0.6 % — HIGH (ref 0.1–0.3)
INR BLD: 0.85 RATIO — SIGNIFICANT CHANGE UP (ref 0.65–1.3)
LYMPHOCYTES # BLD AUTO: 1.32 K/UL — SIGNIFICANT CHANGE UP (ref 1.2–3.4)
LYMPHOCYTES # BLD AUTO: 25.4 % — SIGNIFICANT CHANGE UP (ref 20.5–51.1)
MCHC RBC-ENTMCNC: 26.5 PG — LOW (ref 27–31)
MCHC RBC-ENTMCNC: 30.9 G/DL — LOW (ref 32–37)
MCV RBC AUTO: 85.7 FL — SIGNIFICANT CHANGE UP (ref 81–99)
MONOCYTES # BLD AUTO: 0.43 K/UL — SIGNIFICANT CHANGE UP (ref 0.1–0.6)
MONOCYTES NFR BLD AUTO: 8.3 % — SIGNIFICANT CHANGE UP (ref 1.7–9.3)
NEUTROPHILS # BLD AUTO: 3.3 K/UL — SIGNIFICANT CHANGE UP (ref 1.4–6.5)
NEUTROPHILS NFR BLD AUTO: 63.6 % — SIGNIFICANT CHANGE UP (ref 42.2–75.2)
NRBC BLD AUTO-RTO: 0 /100 WBCS — SIGNIFICANT CHANGE UP (ref 0–0)
PLATELET # BLD AUTO: 243 K/UL — SIGNIFICANT CHANGE UP (ref 130–400)
PMV BLD: 11.8 FL — HIGH (ref 7.4–10.4)
POTASSIUM SERPL-MCNC: 5.6 MMOL/L — HIGH (ref 3.5–5)
POTASSIUM SERPL-SCNC: 5.6 MMOL/L — HIGH (ref 3.5–5)
PROT SERPL-MCNC: 7.2 G/DL — SIGNIFICANT CHANGE UP (ref 6–8)
PROTHROM AB SERPL-ACNC: 10 SEC — SIGNIFICANT CHANGE UP (ref 9.95–12.87)
RBC # BLD: 5.1 M/UL — SIGNIFICANT CHANGE UP (ref 4.2–5.4)
RBC # FLD: 13.7 % — SIGNIFICANT CHANGE UP (ref 11.5–14.5)
SODIUM SERPL-SCNC: 143 MMOL/L — SIGNIFICANT CHANGE UP (ref 135–146)
WBC # BLD: 5.19 K/UL — SIGNIFICANT CHANGE UP (ref 4.8–10.8)
WBC # FLD AUTO: 5.19 K/UL — SIGNIFICANT CHANGE UP (ref 4.8–10.8)

## 2025-03-06 PROCEDURE — 86850 RBC ANTIBODY SCREEN: CPT

## 2025-03-06 PROCEDURE — 93005 ELECTROCARDIOGRAM TRACING: CPT

## 2025-03-06 PROCEDURE — 71046 X-RAY EXAM CHEST 2 VIEWS: CPT

## 2025-03-06 PROCEDURE — 85610 PROTHROMBIN TIME: CPT

## 2025-03-06 PROCEDURE — 86901 BLOOD TYPING SEROLOGIC RH(D): CPT

## 2025-03-06 PROCEDURE — 36415 COLL VENOUS BLD VENIPUNCTURE: CPT

## 2025-03-06 PROCEDURE — 80053 COMPREHEN METABOLIC PANEL: CPT

## 2025-03-06 PROCEDURE — 71046 X-RAY EXAM CHEST 2 VIEWS: CPT | Mod: 26

## 2025-03-06 PROCEDURE — 85025 COMPLETE CBC W/AUTO DIFF WBC: CPT

## 2025-03-06 PROCEDURE — 86900 BLOOD TYPING SEROLOGIC ABO: CPT

## 2025-03-06 PROCEDURE — 99214 OFFICE O/P EST MOD 30 MIN: CPT | Mod: 25

## 2025-03-06 PROCEDURE — 93010 ELECTROCARDIOGRAM REPORT: CPT

## 2025-03-06 PROCEDURE — 85730 THROMBOPLASTIN TIME PARTIAL: CPT

## 2025-03-06 NOTE — H&P PST ADULT - HISTORY OF PRESENT ILLNESS
Patient is a 65 year old  female presenting to PAST in preparation for   on   under anesthesia by Dr. Jairo Cameron .  pt reports 3 yr hx of lung nodules "in past 6 months-year have increased& Dr Gonzalez wants them biopsied"  pt w/chronic cough w/ clear sputum denies hemoptysis    PATIENT/ Guardian CURRENTLY DENIES CHEST PAIN  SHORTNESS OF BREATH  PALPITATIONS,  DYSURIA, OR UPPER RESPIRATORY INFECTION IN PAST 2 WEEKS  denies travel outside the [place] in the past 30 days    Anesthesia Alert  NO--Difficult Airway  NO--History of neck surgery or radiation  NO--Limited ROM of neck  NO--History of Malignant hyperthermia  NO--No personal or family history of Pseudocholinesterase deficiency.  NO--Prior Anesthesia Complication  NO--Latex Allergy  NO--Loose teeth  NO--History of Rheumatoid Arthritis  NO--Bleeding risk  NO--KENROY  NO--Other_____    PT/Guardian DENIES ANY RASHES, ABRASION, OR OPEN WOUNDS OR CUTS    AS PER THE PT/Guardian, THIS IS HIS/HER COMPLETE MEDICAL AND SURGICAL HX, INCLUDING MEDICATIONS PRESCRIBED AND OVER THE COUNTER    Patient/ Guardian verbalized understanding of instructions and was given the opportunity to ask questions and have them answered.    pt/ Guardian denies any suicidal ideation or thoughts, pt states not a threat to self or others    Revised Cardiac Risk Index for Pre-Operative Risk from [employer]  on 3/6/2025  ** All calculations should be rechecked by clinician prior to use **    RESULT SUMMARY:  0 points  RCRI Score    3.9 %  Risk of major cardiac event      INPUTS:  High-risk surgery —> 0 = No  History of ischemic heart disease —> 0 = No  History of congestive heart failure —> 0 = No  History of cerebrovascular disease —> 0 = No  Pre-operative treatment with insulin —> 0 = No  Pre-operative creatinine >2 mg/dL / 176.8 µmol/L —> 0 = No    Duke Activity Status Index (DASI) from [employer]  on 3/6/2025  ** All calculations should be rechecked by clinician prior to use **    RESULT SUMMARY:  28.7 points  The higher the score (maximum 58.2), the higher the functional status.    6.27 METs        INPUTS:  Take care of self —> 2.75 = Yes  Walk indoors —> 1.75 = Yes  Walk 1&ndash;2 blocks on level ground —> 2.75 = Yes  Climb a flight of stairs or walk up a hill —> 5.5 = Yes  Run a short distance —> 8 = Yes  Do light work around the house —> 2.7 = Yes  Do moderate work around the house —> 0 = No  Do heavy work around the house —> 0 = No  Do yardwork —> 0 = No  Have sexual relations —> 5.25 = Yes  Participate in moderate recreational activities —> 0 = No  Participate in strenuous sports —> 0 = No

## 2025-03-06 NOTE — H&P PST ADULT - NSICDXPASTSURGICALHX_GEN_ALL_CORE_FT
PAST SURGICAL HISTORY:  S/P hip replacement, right     S/P hysterectomy     S/P tonsillectomy     Status post gastric banding

## 2025-03-06 NOTE — H&P PST ADULT - NSICDXPASTMEDICALHX_GEN_ALL_CORE_FT
PAST MEDICAL HISTORY:  Cervical cancer     Chronic hepatitis C without hepatic coma     COPD exacerbation     ETOH abuse     Former smoker     Heroin abuse     History of osteonecrosis     HTN (hypertension)

## 2025-03-07 DIAGNOSIS — Z01.818 ENCOUNTER FOR OTHER PREPROCEDURAL EXAMINATION: ICD-10-CM

## 2025-03-07 DIAGNOSIS — R91.1 SOLITARY PULMONARY NODULE: ICD-10-CM

## 2025-03-07 PROBLEM — I10 ESSENTIAL (PRIMARY) HYPERTENSION: Chronic | Status: ACTIVE | Noted: 2025-03-06

## 2025-03-07 PROBLEM — J44.1 CHRONIC OBSTRUCTIVE PULMONARY DISEASE WITH (ACUTE) EXACERBATION: Chronic | Status: ACTIVE | Noted: 2025-03-06

## 2025-03-07 PROBLEM — Z87.891 PERSONAL HISTORY OF NICOTINE DEPENDENCE: Chronic | Status: ACTIVE | Noted: 2025-03-06

## 2025-03-07 PROBLEM — F11.10 OPIOID ABUSE, UNCOMPLICATED: Chronic | Status: ACTIVE | Noted: 2025-03-06

## 2025-03-07 PROBLEM — F10.10 ALCOHOL ABUSE, UNCOMPLICATED: Chronic | Status: ACTIVE | Noted: 2025-03-06

## 2025-03-07 NOTE — BEHAVIORAL HEALTH ASSESSMENT NOTE - VETERAN
Show Aperture Variable?: Yes Consent: The patient's consent was obtained including but not limited to risks of crusting, scabbing, blistering, scarring, darker or lighter pigmentary change, recurrence, incomplete removal and infection. Render Note In Bullet Format When Appropriate: No Detail Level: Detailed Number Of Freeze-Thaw Cycles: 2 freeze-thaw cycles Duration Of Freeze Thaw-Cycle (Seconds): 3 Post-Care Instructions: I reviewed with the patient in detail post-care instructions. Patient is to wear sunprotection, and avoid picking at any of the treated lesions. Pt may apply Vaseline to crusted or scabbing areas. Application Tool (Optional): Liquid Nitrogen Sprayer No

## 2025-03-10 ENCOUNTER — OUTPATIENT (OUTPATIENT)
Dept: OUTPATIENT SERVICES | Facility: HOSPITAL | Age: 65
LOS: 1 days | End: 2025-03-10
Payer: MEDICAID

## 2025-03-10 DIAGNOSIS — Z02.9 ENCOUNTER FOR ADMINISTRATIVE EXAMINATIONS, UNSPECIFIED: ICD-10-CM

## 2025-03-10 DIAGNOSIS — Z98.84 BARIATRIC SURGERY STATUS: Chronic | ICD-10-CM

## 2025-03-10 DIAGNOSIS — Z96.641 PRESENCE OF RIGHT ARTIFICIAL HIP JOINT: Chronic | ICD-10-CM

## 2025-03-10 DIAGNOSIS — Z90.89 ACQUIRED ABSENCE OF OTHER ORGANS: Chronic | ICD-10-CM

## 2025-03-10 DIAGNOSIS — Z90.710 ACQUIRED ABSENCE OF BOTH CERVIX AND UTERUS: Chronic | ICD-10-CM

## 2025-03-10 LAB
POTASSIUM SERPL-MCNC: 5.2 MMOL/L — HIGH (ref 3.5–5)
POTASSIUM SERPL-SCNC: 5.2 MMOL/L — HIGH (ref 3.5–5)

## 2025-03-10 PROCEDURE — 84132 ASSAY OF SERUM POTASSIUM: CPT

## 2025-03-10 PROCEDURE — 36415 COLL VENOUS BLD VENIPUNCTURE: CPT

## 2025-03-11 DIAGNOSIS — Z02.9 ENCOUNTER FOR ADMINISTRATIVE EXAMINATIONS, UNSPECIFIED: ICD-10-CM

## 2025-03-12 NOTE — ASU PATIENT PROFILE, ADULT - NS PRO MODE OF ARRIVAL
ambulatory Debridement Text: The wound edges were debrided prior to proceeding with the closure to facilitate wound healing.

## 2025-03-12 NOTE — ASU PATIENT PROFILE, ADULT - VISION (WITH CORRECTIVE LENSES IF THE PATIENT USUALLY WEARS THEM):
English Partially impaired: cannot see medication labels or newsprint, but can see obstacles in path, and the surrounding layout; can count fingers at arm's length

## 2025-03-13 ENCOUNTER — OUTPATIENT (OUTPATIENT)
Dept: OUTPATIENT SERVICES | Facility: HOSPITAL | Age: 65
LOS: 1 days | Discharge: HOME | End: 2025-03-13
Payer: MEDICAID

## 2025-03-13 ENCOUNTER — RESULT REVIEW (OUTPATIENT)
Age: 65
End: 2025-03-13

## 2025-03-13 ENCOUNTER — APPOINTMENT (OUTPATIENT)
Dept: CARDIOTHORACIC SURGERY | Facility: HOSPITAL | Age: 65
End: 2025-03-13

## 2025-03-13 VITALS
OXYGEN SATURATION: 94 % | DIASTOLIC BLOOD PRESSURE: 65 MMHG | SYSTOLIC BLOOD PRESSURE: 155 MMHG | RESPIRATION RATE: 20 BRPM

## 2025-03-13 VITALS
SYSTOLIC BLOOD PRESSURE: 95 MMHG | WEIGHT: 169.98 LBS | OXYGEN SATURATION: 95 % | TEMPERATURE: 98 F | HEART RATE: 74 BPM | RESPIRATION RATE: 14 BRPM | DIASTOLIC BLOOD PRESSURE: 54 MMHG | HEIGHT: 64 IN

## 2025-03-13 DIAGNOSIS — Z98.84 BARIATRIC SURGERY STATUS: Chronic | ICD-10-CM

## 2025-03-13 DIAGNOSIS — Z96.641 PRESENCE OF RIGHT ARTIFICIAL HIP JOINT: Chronic | ICD-10-CM

## 2025-03-13 DIAGNOSIS — R91.1 SOLITARY PULMONARY NODULE: ICD-10-CM

## 2025-03-13 DIAGNOSIS — Z90.89 ACQUIRED ABSENCE OF OTHER ORGANS: Chronic | ICD-10-CM

## 2025-03-13 DIAGNOSIS — Z90.710 ACQUIRED ABSENCE OF BOTH CERVIX AND UTERUS: Chronic | ICD-10-CM

## 2025-03-13 PROCEDURE — 71045 X-RAY EXAM CHEST 1 VIEW: CPT | Mod: 26

## 2025-03-13 PROCEDURE — 71250 CT THORAX DX C-: CPT

## 2025-03-13 PROCEDURE — 88305 TISSUE EXAM BY PATHOLOGIST: CPT

## 2025-03-13 PROCEDURE — 87206 SMEAR FLUORESCENT/ACID STAI: CPT

## 2025-03-13 PROCEDURE — 31627 NAVIGATIONAL BRONCHOSCOPY: CPT

## 2025-03-13 PROCEDURE — 88305 TISSUE EXAM BY PATHOLOGIST: CPT | Mod: 26

## 2025-03-13 PROCEDURE — 31624 DX BRONCHOSCOPE/LAVAGE: CPT

## 2025-03-13 PROCEDURE — 87116 MYCOBACTERIA CULTURE: CPT

## 2025-03-13 PROCEDURE — 71250 CT THORAX DX C-: CPT | Mod: 26

## 2025-03-13 PROCEDURE — 87205 SMEAR GRAM STAIN: CPT

## 2025-03-13 PROCEDURE — 88312 SPECIAL STAINS GROUP 1: CPT | Mod: 26

## 2025-03-13 PROCEDURE — 88112 CYTOPATH CELL ENHANCE TECH: CPT | Mod: 26,59

## 2025-03-13 PROCEDURE — 87070 CULTURE OTHR SPECIMN AEROBIC: CPT

## 2025-03-13 PROCEDURE — 31628 BRONCHOSCOPY/LUNG BX EACH: CPT

## 2025-03-13 PROCEDURE — 88312 SPECIAL STAINS GROUP 1: CPT

## 2025-03-13 PROCEDURE — 88112 CYTOPATH CELL ENHANCE TECH: CPT

## 2025-03-13 PROCEDURE — S2900: CPT

## 2025-03-13 PROCEDURE — 87015 SPECIMEN INFECT AGNT CONCNTJ: CPT

## 2025-03-13 PROCEDURE — 87102 FUNGUS ISOLATION CULTURE: CPT

## 2025-03-13 PROCEDURE — 71045 X-RAY EXAM CHEST 1 VIEW: CPT

## 2025-03-13 PROCEDURE — 88173 CYTOPATH EVAL FNA REPORT: CPT

## 2025-03-13 PROCEDURE — C9399: CPT

## 2025-03-13 PROCEDURE — 88173 CYTOPATH EVAL FNA REPORT: CPT | Mod: 26

## 2025-03-13 RX ORDER — LISINOPRIL 30 MG/1
0 TABLET ORAL
Qty: 0 | Refills: 0 | DISCHARGE

## 2025-03-13 RX ORDER — SODIUM CHLORIDE 9 G/1000ML
1000 INJECTION, SOLUTION INTRAVENOUS
Refills: 0 | Status: DISCONTINUED | OUTPATIENT
Start: 2025-03-13 | End: 2025-03-13

## 2025-03-13 RX ORDER — ACETAMINOPHEN 500 MG/5ML
1000 LIQUID (ML) ORAL ONCE
Refills: 0 | Status: DISCONTINUED | OUTPATIENT
Start: 2025-03-13 | End: 2025-03-13

## 2025-03-13 RX ORDER — BUPROPION HYDROBROMIDE 522 MG/1
1 TABLET, EXTENDED RELEASE ORAL
Refills: 0 | DISCHARGE

## 2025-03-13 NOTE — CHART NOTE - NSCHARTNOTEFT_GEN_A_CORE
PACU ANESTHESIA ADMISSION NOTE      Procedure:   Post op diagnosis:      ____  Intubated  TV:______       Rate: ______      FiO2: ______    __x__  Patent Airway    _x___  Full return of protective reflexes    _x___  Full recovery from anesthesia / back to baseline     Vitals:   37 R:  11                BP:  159/99                Sat: 92                  P: 78      Mental Status:  __x__ Awake   ___x__ Alert   _____ Drowsy   _____ Sedated    Nausea/Vomiting:  __x__ NO  ______Yes,   See Post - Op Orders          Pain Scale (0-10):  ____x_    Treatment: ____ None    ____ See Post - Op/PCA Orders    Post - Operative Fluids:   _x___ Oral   ____ See Post - Op Orders    Plan: Discharge:   x____Home       _____Floor     _____Critical Care    _____  Other:_________________    Comments:

## 2025-03-13 NOTE — ASU DISCHARGE PLAN (ADULT/PEDIATRIC) - FINANCIAL ASSISTANCE
[facility] provides services at a reduced cost to those who are determined to be eligible through [facility]’s financial assistance program. Information regarding [facility]’s financial assistance program can be found by going to [email or web address] or by calling [phone].

## 2025-03-13 NOTE — ASU DISCHARGE PLAN (ADULT/PEDIATRIC) - CARE PROVIDER_API CALL
Jeremy Lucero  Thoracic and Cardiac Surgery  [address] [place] [address], [address]  [place] [address]  Phone: [phone]  Fax: [phone]  Follow Up Time:

## 2025-03-13 NOTE — ASU DISCHARGE PLAN (ADULT/PEDIATRIC) - NS MD DC FALL RISK RISK
For information on Fall & Injury Prevention, visit: [email or web address] OR  [email or web address] OR  https://www.cdc.gov/steadi/patient.html

## 2025-03-14 LAB
GRAM STN FLD: ABNORMAL
GRAM STN FLD: SIGNIFICANT CHANGE UP
GRAM STN FLD: SIGNIFICANT CHANGE UP
NIGHT BLUE STAIN TISS: SIGNIFICANT CHANGE UP
SPECIMEN SOURCE: SIGNIFICANT CHANGE UP

## 2025-03-15 LAB
CULTURE RESULTS: ABNORMAL
CULTURE RESULTS: SIGNIFICANT CHANGE UP
CULTURE RESULTS: SIGNIFICANT CHANGE UP
SPECIMEN SOURCE: SIGNIFICANT CHANGE UP

## 2025-03-17 LAB — SURGICAL PATHOLOGY STUDY: SIGNIFICANT CHANGE UP

## 2025-03-18 LAB
NON-GYNECOLOGICAL CYTOLOGY STUDY: SIGNIFICANT CHANGE UP

## 2025-03-25 ENCOUNTER — APPOINTMENT (OUTPATIENT)
Dept: CARDIOTHORACIC SURGERY | Facility: CLINIC | Age: 65
End: 2025-03-25
Payer: MEDICAID

## 2025-03-25 DIAGNOSIS — R91.8 OTHER NONSPECIFIC ABNORMAL FINDING OF LUNG FIELD: ICD-10-CM

## 2025-03-25 PROCEDURE — 99212 OFFICE O/P EST SF 10 MIN: CPT

## 2025-04-10 ENCOUNTER — APPOINTMENT (OUTPATIENT)
Dept: SLEEP CENTER | Facility: HOSPITAL | Age: 65
End: 2025-04-10

## 2025-04-10 ENCOUNTER — OUTPATIENT (OUTPATIENT)
Dept: OUTPATIENT SERVICES | Facility: HOSPITAL | Age: 65
LOS: 1 days | Discharge: HOME | End: 2025-04-10
Payer: MEDICAID

## 2025-04-10 DIAGNOSIS — Z90.710 ACQUIRED ABSENCE OF BOTH CERVIX AND UTERUS: Chronic | ICD-10-CM

## 2025-04-10 DIAGNOSIS — Z96.641 PRESENCE OF RIGHT ARTIFICIAL HIP JOINT: Chronic | ICD-10-CM

## 2025-04-10 DIAGNOSIS — Z90.89 ACQUIRED ABSENCE OF OTHER ORGANS: Chronic | ICD-10-CM

## 2025-04-10 DIAGNOSIS — G47.33 OBSTRUCTIVE SLEEP APNEA (ADULT) (PEDIATRIC): ICD-10-CM

## 2025-04-10 DIAGNOSIS — Z98.84 BARIATRIC SURGERY STATUS: Chronic | ICD-10-CM

## 2025-04-10 PROCEDURE — 95810 POLYSOM 6/> YRS 4/> PARAM: CPT | Mod: 26

## 2025-04-10 PROCEDURE — 95810 POLYSOM 6/> YRS 4/> PARAM: CPT

## 2025-04-12 DIAGNOSIS — G47.33 OBSTRUCTIVE SLEEP APNEA (ADULT) (PEDIATRIC): ICD-10-CM

## 2025-06-23 ENCOUNTER — NON-APPOINTMENT (OUTPATIENT)
Age: 65
End: 2025-06-23

## 2025-07-14 NOTE — ASU PREOP CHECKLIST - AS BP NONINV METHOD
This visit is being performed virtually via Video visit using [employer] [place] [employer].   Clinical Location: Home  Mag's location Home and is physically present in the [facility] at the time of this visit.      Time start: 9:00 AM  Time end: 10:00 AM  Diagnosis: Adjustment disorder with anxious and depressed mood     INDIVIDUAL THERAPY SESSION     Progress Note:   Client talked about this past Friday, which was the 3-year-anniversary of the death of her father. Client stated that she went to work for a while but then left to be together with her family at a balloon release honoring her father. Client stated that she was tearful and later tried not to cry as she comforted her brother. Client stated that she returned to work hoping for a distraction but realized that she continued to think about her father that day as well as the next day. Client stated that sometimes she tries not to cry because she learned that not crying was a sign of strength. Client stated that the thought of her father that she was focused on that day was a time when she saw him laying on the couch and had realized that he was very sick. Client stated that the realization of his illness had hit her differently that day. Client stated that during this past weekend, she also tried to remind herself that she was not the only one that lost her dad and that her brother lost his dad too.   Discussed a different perspective on crying, sadness, feelings and mourning. Discussed that everyone mourns in their own way and that the days are not designated. Discussed that it can be very healthy to cry about a death of a loved one and that this can be a strength instead of a weakness. Discussed that keeping feelings inside is unhealthy for the physical body. Empathized with the way client was feeling and normalized her feelings.     Rose Acosta, LAURENT, CADC, CDVP      electronic

## 2025-08-12 ENCOUNTER — APPOINTMENT (OUTPATIENT)
Dept: CARDIOTHORACIC SURGERY | Facility: CLINIC | Age: 65
End: 2025-08-12
Payer: MEDICAID

## 2025-08-12 VITALS
OXYGEN SATURATION: 100 % | WEIGHT: 175 LBS | BODY MASS INDEX: 29.88 KG/M2 | RESPIRATION RATE: 16 BRPM | DIASTOLIC BLOOD PRESSURE: 70 MMHG | TEMPERATURE: 98.4 F | HEART RATE: 88 BPM | SYSTOLIC BLOOD PRESSURE: 119 MMHG | HEIGHT: 64 IN

## 2025-08-12 PROCEDURE — 99213 OFFICE O/P EST LOW 20 MIN: CPT

## 2025-08-22 ENCOUNTER — NON-APPOINTMENT (OUTPATIENT)
Age: 65
End: 2025-08-22

## 2025-08-27 ENCOUNTER — OUTPATIENT (OUTPATIENT)
Dept: OUTPATIENT SERVICES | Facility: HOSPITAL | Age: 65
LOS: 1 days | End: 2025-08-27
Payer: MEDICAID

## 2025-08-27 ENCOUNTER — RESULT REVIEW (OUTPATIENT)
Age: 65
End: 2025-08-27

## 2025-08-27 VITALS
RESPIRATION RATE: 16 BRPM | HEART RATE: 86 BPM | DIASTOLIC BLOOD PRESSURE: 86 MMHG | SYSTOLIC BLOOD PRESSURE: 118 MMHG | HEIGHT: 64 IN | OXYGEN SATURATION: 96 % | TEMPERATURE: 98 F | WEIGHT: 175.05 LBS

## 2025-08-27 DIAGNOSIS — Z98.82 BREAST IMPLANT STATUS: Chronic | ICD-10-CM

## 2025-08-27 DIAGNOSIS — Z01.818 ENCOUNTER FOR OTHER PREPROCEDURAL EXAMINATION: ICD-10-CM

## 2025-08-27 DIAGNOSIS — Z90.710 ACQUIRED ABSENCE OF BOTH CERVIX AND UTERUS: Chronic | ICD-10-CM

## 2025-08-27 DIAGNOSIS — Z96.641 PRESENCE OF RIGHT ARTIFICIAL HIP JOINT: Chronic | ICD-10-CM

## 2025-08-27 DIAGNOSIS — R91.8 OTHER NONSPECIFIC ABNORMAL FINDING OF LUNG FIELD: ICD-10-CM

## 2025-08-27 DIAGNOSIS — Z90.89 ACQUIRED ABSENCE OF OTHER ORGANS: Chronic | ICD-10-CM

## 2025-08-27 DIAGNOSIS — Z98.84 BARIATRIC SURGERY STATUS: Chronic | ICD-10-CM

## 2025-08-27 DIAGNOSIS — Z98.890 OTHER SPECIFIED POSTPROCEDURAL STATES: Chronic | ICD-10-CM

## 2025-08-27 LAB
ALBUMIN SERPL ELPH-MCNC: 4.1 G/DL — SIGNIFICANT CHANGE UP (ref 3.5–5.2)
ALP SERPL-CCNC: 101 U/L — SIGNIFICANT CHANGE UP (ref 30–115)
ALT FLD-CCNC: 10 U/L — SIGNIFICANT CHANGE UP (ref 0–41)
ANION GAP SERPL CALC-SCNC: 10 MMOL/L — SIGNIFICANT CHANGE UP (ref 7–14)
APTT BLD: 31.1 SEC — SIGNIFICANT CHANGE UP (ref 27–39.2)
AST SERPL-CCNC: 14 U/L — SIGNIFICANT CHANGE UP (ref 0–41)
BASOPHILS # BLD AUTO: 0.03 K/UL — SIGNIFICANT CHANGE UP (ref 0–0.2)
BASOPHILS NFR BLD AUTO: 0.6 % — SIGNIFICANT CHANGE UP (ref 0–1)
BILIRUB SERPL-MCNC: <0.2 MG/DL — SIGNIFICANT CHANGE UP (ref 0.2–1.2)
BLD GP AB SCN SERPL QL: SIGNIFICANT CHANGE UP
BUN SERPL-MCNC: 24 MG/DL — HIGH (ref 10–20)
CALCIUM SERPL-MCNC: 9.1 MG/DL — SIGNIFICANT CHANGE UP (ref 8.4–10.5)
CHLORIDE SERPL-SCNC: 106 MMOL/L — SIGNIFICANT CHANGE UP (ref 98–110)
CO2 SERPL-SCNC: 26 MMOL/L — SIGNIFICANT CHANGE UP (ref 17–32)
CREAT SERPL-MCNC: 0.9 MG/DL — SIGNIFICANT CHANGE UP (ref 0.7–1.5)
EGFR: 71 ML/MIN/1.73M2 — SIGNIFICANT CHANGE UP
EGFR: 71 ML/MIN/1.73M2 — SIGNIFICANT CHANGE UP
EOSINOPHIL # BLD AUTO: 0.09 K/UL — SIGNIFICANT CHANGE UP (ref 0–0.7)
EOSINOPHIL NFR BLD AUTO: 1.7 % — SIGNIFICANT CHANGE UP (ref 0–8)
GLUCOSE SERPL-MCNC: 86 MG/DL — SIGNIFICANT CHANGE UP (ref 70–99)
HCT VFR BLD CALC: 38.3 % — SIGNIFICANT CHANGE UP (ref 37–47)
HGB BLD-MCNC: 12.1 G/DL — SIGNIFICANT CHANGE UP (ref 12–16)
IMM GRANULOCYTES NFR BLD AUTO: 0.4 % — HIGH (ref 0.1–0.3)
INR BLD: 0.86 RATIO — SIGNIFICANT CHANGE UP (ref 0.65–1.3)
LYMPHOCYTES # BLD AUTO: 1.23 K/UL — SIGNIFICANT CHANGE UP (ref 1.2–3.4)
LYMPHOCYTES # BLD AUTO: 23.7 % — SIGNIFICANT CHANGE UP (ref 20.5–51.1)
MCHC RBC-ENTMCNC: 27.1 PG — SIGNIFICANT CHANGE UP (ref 27–31)
MCHC RBC-ENTMCNC: 31.6 G/DL — LOW (ref 32–37)
MCV RBC AUTO: 85.7 FL — SIGNIFICANT CHANGE UP (ref 81–99)
MONOCYTES # BLD AUTO: 0.42 K/UL — SIGNIFICANT CHANGE UP (ref 0.1–0.6)
MONOCYTES NFR BLD AUTO: 8.1 % — SIGNIFICANT CHANGE UP (ref 1.7–9.3)
NEUTROPHILS # BLD AUTO: 3.4 K/UL — SIGNIFICANT CHANGE UP (ref 1.4–6.5)
NEUTROPHILS NFR BLD AUTO: 65.5 % — SIGNIFICANT CHANGE UP (ref 42.2–75.2)
NRBC BLD AUTO-RTO: 0 /100 WBCS — SIGNIFICANT CHANGE UP (ref 0–0)
PLATELET # BLD AUTO: 204 K/UL — SIGNIFICANT CHANGE UP (ref 130–400)
PMV BLD: 12 FL — HIGH (ref 7.4–10.4)
POTASSIUM SERPL-MCNC: 4.3 MMOL/L — SIGNIFICANT CHANGE UP (ref 3.5–5)
POTASSIUM SERPL-SCNC: 4.3 MMOL/L — SIGNIFICANT CHANGE UP (ref 3.5–5)
PROT SERPL-MCNC: 6.7 G/DL — SIGNIFICANT CHANGE UP (ref 6–8)
PROTHROM AB SERPL-ACNC: 10.1 SEC — SIGNIFICANT CHANGE UP (ref 9.95–12.87)
RBC # BLD: 4.47 M/UL — SIGNIFICANT CHANGE UP (ref 4.2–5.4)
RBC # FLD: 14.1 % — SIGNIFICANT CHANGE UP (ref 11.5–14.5)
SODIUM SERPL-SCNC: 142 MMOL/L — SIGNIFICANT CHANGE UP (ref 135–146)
WBC # BLD: 5.19 K/UL — SIGNIFICANT CHANGE UP (ref 4.8–10.8)
WBC # FLD AUTO: 5.19 K/UL — SIGNIFICANT CHANGE UP (ref 4.8–10.8)

## 2025-08-27 PROCEDURE — 86900 BLOOD TYPING SEROLOGIC ABO: CPT

## 2025-08-27 PROCEDURE — 80053 COMPREHEN METABOLIC PANEL: CPT

## 2025-08-27 PROCEDURE — 86850 RBC ANTIBODY SCREEN: CPT

## 2025-08-27 PROCEDURE — 71046 X-RAY EXAM CHEST 2 VIEWS: CPT

## 2025-08-27 PROCEDURE — 86901 BLOOD TYPING SEROLOGIC RH(D): CPT

## 2025-08-27 PROCEDURE — 93010 ELECTROCARDIOGRAM REPORT: CPT

## 2025-08-27 PROCEDURE — 85025 COMPLETE CBC W/AUTO DIFF WBC: CPT

## 2025-08-27 PROCEDURE — 71046 X-RAY EXAM CHEST 2 VIEWS: CPT | Mod: 26

## 2025-08-27 PROCEDURE — 99214 OFFICE O/P EST MOD 30 MIN: CPT | Mod: 25

## 2025-08-27 PROCEDURE — 85610 PROTHROMBIN TIME: CPT

## 2025-08-27 PROCEDURE — 36415 COLL VENOUS BLD VENIPUNCTURE: CPT

## 2025-08-27 PROCEDURE — 85730 THROMBOPLASTIN TIME PARTIAL: CPT

## 2025-08-27 PROCEDURE — 93005 ELECTROCARDIOGRAM TRACING: CPT

## 2025-08-27 RX ORDER — GABAPENTIN 400 MG/1
1 CAPSULE ORAL
Refills: 0 | DISCHARGE

## 2025-08-28 DIAGNOSIS — R91.8 OTHER NONSPECIFIC ABNORMAL FINDING OF LUNG FIELD: ICD-10-CM

## 2025-08-28 DIAGNOSIS — Z01.818 ENCOUNTER FOR OTHER PREPROCEDURAL EXAMINATION: ICD-10-CM

## 2025-09-11 PROBLEM — R91.1 SOLITARY PULMONARY NODULE: Chronic | Status: ACTIVE | Noted: 2025-08-27

## 2025-09-12 ENCOUNTER — RESULT REVIEW (OUTPATIENT)
Age: 65
End: 2025-09-12

## 2025-09-12 ENCOUNTER — OUTPATIENT (OUTPATIENT)
Dept: OUTPATIENT SERVICES | Facility: HOSPITAL | Age: 65
LOS: 1 days | Discharge: HOME | End: 2025-09-12
Payer: MEDICAID

## 2025-09-12 ENCOUNTER — APPOINTMENT (OUTPATIENT)
Dept: CARDIOTHORACIC SURGERY | Facility: HOSPITAL | Age: 65
End: 2025-09-12

## 2025-09-12 VITALS
OXYGEN SATURATION: 95 % | RESPIRATION RATE: 17 BRPM | SYSTOLIC BLOOD PRESSURE: 144 MMHG | DIASTOLIC BLOOD PRESSURE: 79 MMHG | HEART RATE: 85 BPM

## 2025-09-12 VITALS — HEART RATE: 76 BPM | TEMPERATURE: 98 F | SYSTOLIC BLOOD PRESSURE: 103 MMHG | DIASTOLIC BLOOD PRESSURE: 65 MMHG

## 2025-09-12 DIAGNOSIS — Z96.641 PRESENCE OF RIGHT ARTIFICIAL HIP JOINT: Chronic | ICD-10-CM

## 2025-09-12 DIAGNOSIS — Z90.710 ACQUIRED ABSENCE OF BOTH CERVIX AND UTERUS: Chronic | ICD-10-CM

## 2025-09-12 DIAGNOSIS — Z98.890 OTHER SPECIFIED POSTPROCEDURAL STATES: Chronic | ICD-10-CM

## 2025-09-12 DIAGNOSIS — R91.8 OTHER NONSPECIFIC ABNORMAL FINDING OF LUNG FIELD: ICD-10-CM

## 2025-09-12 DIAGNOSIS — Z98.84 BARIATRIC SURGERY STATUS: Chronic | ICD-10-CM

## 2025-09-12 DIAGNOSIS — Z98.82 BREAST IMPLANT STATUS: Chronic | ICD-10-CM

## 2025-09-12 DIAGNOSIS — Z90.89 ACQUIRED ABSENCE OF OTHER ORGANS: Chronic | ICD-10-CM

## 2025-09-12 LAB
NIGHT BLUE STAIN TISS: SIGNIFICANT CHANGE UP
NIGHT BLUE STAIN TISS: SIGNIFICANT CHANGE UP
SPECIMEN SOURCE: SIGNIFICANT CHANGE UP
SPECIMEN SOURCE: SIGNIFICANT CHANGE UP

## 2025-09-12 PROCEDURE — 87116 MYCOBACTERIA CULTURE: CPT

## 2025-09-12 PROCEDURE — 31624 DX BRONCHOSCOPE/LAVAGE: CPT

## 2025-09-12 PROCEDURE — 87102 FUNGUS ISOLATION CULTURE: CPT

## 2025-09-12 PROCEDURE — 31628 BRONCHOSCOPY/LUNG BX EACH: CPT

## 2025-09-12 PROCEDURE — 71045 X-RAY EXAM CHEST 1 VIEW: CPT | Mod: 26

## 2025-09-12 PROCEDURE — 71250 CT THORAX DX C-: CPT | Mod: 26

## 2025-09-12 PROCEDURE — 31627 NAVIGATIONAL BRONCHOSCOPY: CPT

## 2025-09-12 PROCEDURE — 87070 CULTURE OTHR SPECIMN AEROBIC: CPT

## 2025-09-12 PROCEDURE — 71045 X-RAY EXAM CHEST 1 VIEW: CPT

## 2025-09-12 PROCEDURE — 71250 CT THORAX DX C-: CPT

## 2025-09-12 RX ORDER — OXYCODONE HYDROCHLORIDE 30 MG/1
5 TABLET ORAL ONCE
Refills: 0 | Status: DISCONTINUED | OUTPATIENT
Start: 2025-09-12 | End: 2025-09-12

## 2025-09-12 RX ORDER — ONDANSETRON HCL/PF 4 MG/2 ML
4 VIAL (ML) INJECTION ONCE
Refills: 0 | Status: DISCONTINUED | OUTPATIENT
Start: 2025-09-12 | End: 2025-09-12

## 2025-09-12 RX ORDER — HYDROMORPHONE/SOD CHLOR,ISO/PF 2 MG/10 ML
0.5 SYRINGE (ML) INJECTION
Refills: 0 | Status: DISCONTINUED | OUTPATIENT
Start: 2025-09-12 | End: 2025-09-12

## 2025-09-12 RX ORDER — HYDROMORPHONE/SOD CHLOR,ISO/PF 2 MG/10 ML
1 SYRINGE (ML) INJECTION
Refills: 0 | Status: DISCONTINUED | OUTPATIENT
Start: 2025-09-12 | End: 2025-09-12

## 2025-09-12 RX ORDER — ACETAMINOPHEN 500 MG/5ML
2 LIQUID (ML) ORAL
Qty: 40 | Refills: 0
Start: 2025-09-12 | End: 2025-09-16

## 2025-09-12 RX ORDER — SODIUM CHLORIDE 9 G/1000ML
1000 INJECTION, SOLUTION INTRAVENOUS
Refills: 0 | Status: DISCONTINUED | OUTPATIENT
Start: 2025-09-12 | End: 2025-09-12

## 2025-09-13 LAB
CULTURE RESULTS: NO GROWTH — SIGNIFICANT CHANGE UP
CULTURE RESULTS: SIGNIFICANT CHANGE UP
GRAM STN FLD: SIGNIFICANT CHANGE UP
GRAM STN FLD: SIGNIFICANT CHANGE UP
SPECIMEN SOURCE: SIGNIFICANT CHANGE UP

## 2025-09-15 LAB — SURGICAL PATHOLOGY STUDY: SIGNIFICANT CHANGE UP

## 2025-09-16 LAB
NON-GYNECOLOGICAL CYTOLOGY STUDY: SIGNIFICANT CHANGE UP

## 2025-09-18 LAB
NON-GYNECOLOGICAL CYTOLOGY STUDY: SIGNIFICANT CHANGE UP